# Patient Record
Sex: MALE | Race: WHITE | Employment: OTHER | URBAN - NONMETROPOLITAN AREA
[De-identification: names, ages, dates, MRNs, and addresses within clinical notes are randomized per-mention and may not be internally consistent; named-entity substitution may affect disease eponyms.]

---

## 2018-11-16 ENCOUNTER — TELEPHONE (OUTPATIENT)
Dept: BARIATRICS/WEIGHT MGMT | Age: 53
End: 2018-11-16

## 2018-12-14 ENCOUNTER — OFFICE VISIT (OUTPATIENT)
Dept: BARIATRICS/WEIGHT MGMT | Age: 53
End: 2018-12-14
Payer: COMMERCIAL

## 2018-12-14 VITALS
HEART RATE: 88 BPM | WEIGHT: 235 LBS | HEIGHT: 65 IN | DIASTOLIC BLOOD PRESSURE: 110 MMHG | BODY MASS INDEX: 39.15 KG/M2 | TEMPERATURE: 97.9 F | SYSTOLIC BLOOD PRESSURE: 164 MMHG

## 2018-12-14 DIAGNOSIS — E66.9 OBESITY (BMI 30-39.9): Primary | ICD-10-CM

## 2018-12-14 DIAGNOSIS — K21.9 GASTROESOPHAGEAL REFLUX DISEASE, ESOPHAGITIS PRESENCE NOT SPECIFIED: ICD-10-CM

## 2018-12-14 DIAGNOSIS — G89.29 CHRONIC BILATERAL LOW BACK PAIN WITHOUT SCIATICA: ICD-10-CM

## 2018-12-14 DIAGNOSIS — E78.00 HYPERCHOLESTEREMIA: ICD-10-CM

## 2018-12-14 DIAGNOSIS — N20.0 NEPHROLITHIASIS: ICD-10-CM

## 2018-12-14 DIAGNOSIS — G47.33 OBSTRUCTIVE SLEEP APNEA: ICD-10-CM

## 2018-12-14 DIAGNOSIS — M54.50 CHRONIC BILATERAL LOW BACK PAIN WITHOUT SCIATICA: ICD-10-CM

## 2018-12-14 DIAGNOSIS — F32.A DEPRESSION, UNSPECIFIED DEPRESSION TYPE: ICD-10-CM

## 2018-12-14 DIAGNOSIS — I10 HYPERTENSION, UNSPECIFIED TYPE: ICD-10-CM

## 2018-12-14 PROCEDURE — 99203 OFFICE O/P NEW LOW 30 MIN: CPT | Performed by: SURGERY

## 2018-12-14 ASSESSMENT — ENCOUNTER SYMPTOMS
COLOR CHANGE: 0
VOMITING: 0
CHEST TIGHTNESS: 0
SHORTNESS OF BREATH: 0
NAUSEA: 0
EYE REDNESS: 0
ANAL BLEEDING: 0
RHINORRHEA: 0
STRIDOR: 0
EYE ITCHING: 0
TROUBLE SWALLOWING: 0
DIARRHEA: 0
BACK PAIN: 1
CONSTIPATION: 0
EYE PAIN: 0
APNEA: 0
ABDOMINAL DISTENTION: 0
FACIAL SWELLING: 0
WHEEZING: 0
VOICE CHANGE: 0
SINUS PRESSURE: 0
ALLERGIC/IMMUNOLOGIC NEGATIVE: 1
PHOTOPHOBIA: 0
BLOOD IN STOOL: 0
COUGH: 0
ABDOMINAL PAIN: 0
RECTAL PAIN: 0
EYE DISCHARGE: 0
SORE THROAT: 0
CHOKING: 0

## 2018-12-14 NOTE — LETTER
12/14/2018    Dear Marty De La O DO,  I was pleased to see your patient Allyssa Duenas (date of birth 1965) in the [address] [place] [address] [facility] for an evaluation. Below are the relevant portions of my assessment and plan of care. Assessment:  1. Obesity (BMI 39)   2. Sleep apnea  3. Hypercholesterolemia  4. Chronic lower back pain  5. Nephrolithiasis  6. Hypertension  7. Depression  8. GERD    Plan:  Management of Allyssa alba obesity through a monitored weight loss program with long-term follow-up. Janes Ojeda is interested in Bariatric surgery for treatment of his morbid obesity. We discussed the various surgeries with their associated risks and benefits, along with alternatives to surgery. He has elected to proceed with a Robotic sleeve gastrectomy, if he is not able to lose adequate excess body weight by medical management only. It is my opinion that significant weight loss through a monitored program including bariatric surgery will likely improve and often resolve many of the patients weight related co-morbid conditions. Failure to undergo sustained weight loss will likely result in progression of current conditions and may lead to an early death. We offer a comprehensive, multidisciplinary program designed to meet the needs required for long-term weight loss treatment of Janes Mock obesity. A program such as this is needed to have maximum long-term success with weight loss for patients who choose to undergo surgery. 1. Janes Ojeda will start with a referral to our dietitian's for evaluation and treatment. At the visit, goals and nutrition plans are instituted, including vitamin supplementation. He will meet with the dietitian each month to assess progress and any barriers that Janes Ojeda is experiencing to accomplish his weight loss goals. 3% total body weight loss is required prior to any surgical intervention.

## 2018-12-14 NOTE — PROGRESS NOTES
Current Outpatient Prescriptions   Medication Sig Dispense Refill    metoprolol succinate (TOPROL XL) 50 MG extended release tablet Take 1 tablet by mouth daily 90 tablet 3    naproxen (NAPROSYN) 500 MG tablet Take 1 tablet by mouth 2 times daily as needed for Pain 28 tablet 0    fluticasone (FLONASE) 50 MCG/ACT nasal spray 2 sprays by Nasal route daily 1 Bottle 3     No current facility-administered medications for this visit. No Known Allergies    Family History   Problem Relation Age of Onset    Stroke Father     Kidney Disease Father     Heart Disease Mother     Cancer Mother         kidney diagnosed at age 79       Social History     Social History    Marital status:      Spouse name: N/A    Number of children: N/A    Years of education: N/A     Occupational History    Not on file. Social History Main Topics    Smoking status: Never Smoker    Smokeless tobacco: Never Used    Alcohol use No    Drug use: No    Sexual activity: Yes     Other Topics Concern    Not on file     Social History Narrative    No narrative on file     Vitals:    12/14/18 1059   BP: (!) 164/110   Pulse: 88   Temp: 97.9 °F (36.6 °C)     Body mass index is 39.11 kg/m². Objective:   Physical Exam   Constitutional: He is oriented to person, place, and time. He appears well-developed and well-nourished. He is active and cooperative. Non-toxic appearance. He does not appear ill. No distress. HENT:   Head: Normocephalic and atraumatic. Not macrocephalic and not microcephalic. Head is without raccoon's eyes, without Cruz's sign, without abrasion, without contusion, without laceration, without right periorbital erythema and without left periorbital erythema. Right Ear: External ear normal.   Left Ear: External ear normal.   Nose: Nose normal.   Mouth/Throat: Oropharynx is clear and moist and mucous membranes are normal. No oropharyngeal exudate.    Eyes: Pupils are equal, round, and reactive to Results   Component Value Date     03/21/2016    K 4.1 03/21/2016     03/21/2016    CO2 24 03/21/2016     Lab Results   Component Value Date    CREATININE 1.1 03/21/2016     Lab Results   Component Value Date    WBC 8.1 03/21/2016    HGB 15.4 03/21/2016    HCT 46.0 03/21/2016    MCV 89.5 03/21/2016     03/21/2016     Lab Results   Component Value Date    ALT 30 03/21/2016    AST 45 (H) 03/21/2016    ALKPHOS 51 03/21/2016    BILITOT 0.4 03/21/2016     Imaging - none    Patient Active Problem List   Diagnosis    Essential hypertension    Hyperlipidemia    Chest pain at rest    Syncope and collapse    Chest heaviness    Obstructive sleep apnea syndrome    Non morbid obesity due to excess calories     Assessment:      1. Obesity (BMI 39)   2. Sleep apnea  3. Hypercholesterolemia  4. Chronic lower back pain  5. Nephrolithiasis  6. Hypertension  7. Depression  8. GERD      Plan:      1. Long discussion about the pros and cons of weight loss surgery. The risks benefits and alternatives to laparoscopic adjustable band, gastric sleeve and gastric Jaime-en-Y bypass were discussed in detail. The pros and cons of robotic assisted, laparoscopic and open techniques were discussed. 2.  Behavior modification discussed in detail in regards to dietary habits. 3.  Nutritional education occurred during visit. Will set up a consultation/evaluation with dietitian for further evaluation. 4.  Options for medical management of morbid obesity discussed. 5.  Improvement in fitness/exercise discussed with patient and the need for this with/without surgery. 6.  Obtain medical necessity letter from PCP as needed. 7.  Follow-up in one month at weight management program at [facility]. 8.  Signs and symptoms reviewed with patient that would be concerning and need him to return to office for re-evaluation. Patient states he will call if he has questions or concerns. 9. Multivitamin  10.

## 2019-01-04 ENCOUNTER — OFFICE VISIT (OUTPATIENT)
Dept: BARIATRICS/WEIGHT MGMT | Age: 54
End: 2019-01-04

## 2019-01-04 DIAGNOSIS — E66.01 MORBID OBESITY DUE TO EXCESS CALORIES (HCC): Primary | ICD-10-CM

## 2019-01-18 ENCOUNTER — HOSPITAL ENCOUNTER (OUTPATIENT)
Age: 54
Setting detail: SPECIMEN
Discharge: HOME | End: 2019-01-18
Payer: MEDICARE

## 2019-01-19 LAB
-: NORMAL
ALBUMIN SERPL-MCNC: 4.6 G/DL (ref 3.5–5.2)
ALBUMIN/GLOBULIN RATIO: 1.5 (ref 1–2.5)
ALP BLD-CCNC: 79 U/L (ref 40–129)
ALT SERPL-CCNC: 27 U/L (ref 5–41)
ANION GAP SERPL CALCULATED.3IONS-SCNC: 12 MMOL/L (ref 9–17)
AST SERPL-CCNC: 18 U/L
BILIRUB SERPL-MCNC: 0.32 MG/DL (ref 0.3–1.2)
BUN BLDV-MCNC: 19 MG/DL (ref 6–20)
BUN/CREAT BLD: NORMAL (ref 9–20)
CALCIUM SERPL-MCNC: 9.4 MG/DL (ref 8.6–10.4)
CHLORIDE BLD-SCNC: 100 MMOL/L (ref 98–107)
CHOLESTEROL/HDL RATIO: 5.3
CHOLESTEROL: 168 MG/DL
CO2: 23 MMOL/L (ref 20–31)
CREAT SERPL-MCNC: 1.2 MG/DL (ref 0.7–1.2)
FERRITIN: 316 UG/L (ref 30–400)
FOLATE: 10.4 NG/ML
GFR AFRICAN AMERICAN: >60 ML/MIN
GFR NON-AFRICAN AMERICAN: >60 ML/MIN
GFR SERPL CREATININE-BSD FRML MDRD: NORMAL ML/MIN/{1.73_M2}
GFR SERPL CREATININE-BSD FRML MDRD: NORMAL ML/MIN/{1.73_M2}
GLUCOSE BLD-MCNC: 83 MG/DL (ref 70–99)
HCT VFR BLD CALC: 50.2 % (ref 40.7–50.3)
HDLC SERPL-MCNC: 32 MG/DL
HEMOGLOBIN: 17 G/DL (ref 13–17)
IRON SATURATION: 26 % (ref 20–55)
IRON: 91 UG/DL (ref 59–158)
LDL CHOLESTEROL: 110 MG/DL (ref 0–130)
MCH RBC QN AUTO: 31 PG (ref 25.2–33.5)
MCHC RBC AUTO-ENTMCNC: 33.9 G/DL (ref 28.4–34.8)
MCV RBC AUTO: 91.6 FL (ref 82.6–102.9)
NRBC AUTOMATED: 0 PER 100 WBC
PDW BLD-RTO: 13 % (ref 11.8–14.4)
PLATELET # BLD: 323 K/UL (ref 138–453)
PMV BLD AUTO: 10.2 FL (ref 8.1–13.5)
POTASSIUM SERPL-SCNC: 4.3 MMOL/L (ref 3.7–5.3)
PROSTATE SPECIFIC ANTIGEN: 0.87 UG/L
RBC # BLD: 5.48 M/UL (ref 4.21–5.77)
REASON FOR REJECTION: NORMAL
SODIUM BLD-SCNC: 135 MMOL/L (ref 135–144)
TOTAL IRON BINDING CAPACITY: 353 UG/DL (ref 250–450)
TOTAL PROTEIN: 7.6 G/DL (ref 6.4–8.3)
TRIGL SERPL-MCNC: 132 MG/DL
TSH SERPL DL<=0.05 MIU/L-ACNC: 2.02 MIU/L (ref 0.3–5)
UNSATURATED IRON BINDING CAPACITY: 262 UG/DL (ref 112–347)
VITAMIN B-12: 724 PG/ML (ref 232–1245)
VITAMIN D 25-HYDROXY: 26.3 NG/ML (ref 30–100)
VLDLC SERPL CALC-MCNC: ABNORMAL MG/DL (ref 1–30)
WBC # BLD: 7.8 K/UL (ref 3.5–11.3)
ZZ NTE CLEAN UP: ORDERED TEST: NORMAL
ZZ NTE WITH NAME CLEAN UP: SPECIMEN SOURCE: NORMAL

## 2019-01-20 LAB
ESTIMATED AVERAGE GLUCOSE: 111 MG/DL
HBA1C MFR BLD: 5.5 % (ref 4–6)

## 2019-01-21 ENCOUNTER — HOSPITAL ENCOUNTER (OUTPATIENT)
Age: 54
Setting detail: SPECIMEN
Discharge: HOME | End: 2019-01-21
Payer: MEDICARE

## 2019-01-23 ENCOUNTER — HOSPITAL ENCOUNTER (OUTPATIENT)
Age: 54
Setting detail: SPECIMEN
Discharge: HOME | End: 2019-01-23
Payer: MEDICARE

## 2019-01-24 LAB
3-OH-COTININE URINE: <50 NG/ML
ANABASINE URINE: <3 NG/ML
COTININE, URINE: <5 NG/ML
NICOTINE URINE: 2 NG/ML
NORNICOTINE URINE: <2 NG/ML
PTH INTACT: 66.78 PG/ML (ref 15–65)

## 2019-01-25 LAB
-: NORMAL
REASON FOR REJECTION: NORMAL
ZZ NTE CLEAN UP: ORDERED TEST: NORMAL
ZZ NTE WITH NAME CLEAN UP: SPECIMEN SOURCE: NORMAL

## 2019-02-04 ENCOUNTER — OFFICE VISIT (OUTPATIENT)
Dept: BARIATRICS/WEIGHT MGMT | Age: 54
End: 2019-02-04

## 2019-02-04 ENCOUNTER — OFFICE VISIT (OUTPATIENT)
Dept: BARIATRICS/WEIGHT MGMT | Age: 54
End: 2019-02-04
Payer: COMMERCIAL

## 2019-02-04 VITALS
DIASTOLIC BLOOD PRESSURE: 100 MMHG | WEIGHT: 226 LBS | SYSTOLIC BLOOD PRESSURE: 142 MMHG | TEMPERATURE: 98.5 F | RESPIRATION RATE: 18 BRPM | HEIGHT: 65 IN | BODY MASS INDEX: 37.65 KG/M2 | HEART RATE: 81 BPM

## 2019-02-04 DIAGNOSIS — E66.09 CLASS 2 OBESITY DUE TO EXCESS CALORIES WITH BODY MASS INDEX (BMI) OF 39.0 TO 39.9 IN ADULT, UNSPECIFIED WHETHER SERIOUS COMORBIDITY PRESENT: Primary | ICD-10-CM

## 2019-02-04 DIAGNOSIS — E66.01 MORBID OBESITY DUE TO EXCESS CALORIES (HCC): Primary | ICD-10-CM

## 2019-02-04 DIAGNOSIS — F32.A DEPRESSION, UNSPECIFIED DEPRESSION TYPE: ICD-10-CM

## 2019-02-04 DIAGNOSIS — I10 ESSENTIAL HYPERTENSION: ICD-10-CM

## 2019-02-04 DIAGNOSIS — E55.9 VITAMIN D DEFICIENCY: ICD-10-CM

## 2019-02-04 DIAGNOSIS — K21.9 GASTROESOPHAGEAL REFLUX DISEASE, ESOPHAGITIS PRESENCE NOT SPECIFIED: ICD-10-CM

## 2019-02-04 DIAGNOSIS — E78.00 HYPERCHOLESTEREMIA: ICD-10-CM

## 2019-02-04 DIAGNOSIS — G47.33 OSA (OBSTRUCTIVE SLEEP APNEA): ICD-10-CM

## 2019-02-04 PROCEDURE — 99214 OFFICE O/P EST MOD 30 MIN: CPT | Performed by: PHYSICIAN ASSISTANT

## 2019-02-18 ENCOUNTER — HOSPITAL ENCOUNTER (OUTPATIENT)
Age: 54
Setting detail: SPECIMEN
Discharge: HOME | End: 2019-02-18
Payer: MEDICARE

## 2019-02-22 LAB — VITAMIN B1 WHOLE BLOOD: 117 NMOL/L (ref 70–180)

## 2019-05-22 ENCOUNTER — HOSPITAL ENCOUNTER (EMERGENCY)
Dept: HOSPITAL 83 - ED | Age: 54
LOS: 1 days | Discharge: HOME | End: 2019-05-23
Payer: SELF-PAY

## 2019-05-22 VITALS — HEIGHT: 60 IN
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
## 2019-05-22 DIAGNOSIS — S46.911A: Primary | ICD-10-CM
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
## 2020-03-18 ENCOUNTER — HOSPITAL ENCOUNTER (OUTPATIENT)
Age: 55
Setting detail: SPECIMEN
Discharge: HOME | End: 2020-03-18
Payer: MEDICARE

## 2020-03-18 LAB
ABSOLUTE EOS #: 0.66 K/UL (ref 0–0.44)
ABSOLUTE IMMATURE GRANULOCYTE: <0.03 K/UL (ref 0–0.3)
ABSOLUTE LYMPH #: 2.09 K/UL (ref 1.1–3.7)
ABSOLUTE MONO #: 0.79 K/UL (ref 0.1–1.2)
ALBUMIN SERPL-MCNC: 4.3 G/DL (ref 3.5–5.2)
ALBUMIN/GLOBULIN RATIO: 1.3 (ref 1–2.5)
ALP BLD-CCNC: 69 U/L (ref 40–129)
ALT SERPL-CCNC: 20 U/L (ref 5–41)
ANION GAP SERPL CALCULATED.3IONS-SCNC: 14 MMOL/L (ref 9–17)
AST SERPL-CCNC: 14 U/L
BASOPHILS # BLD: 1 % (ref 0–2)
BASOPHILS ABSOLUTE: 0.06 K/UL (ref 0–0.2)
BILIRUB SERPL-MCNC: 0.23 MG/DL (ref 0.3–1.2)
BUN BLDV-MCNC: 17 MG/DL (ref 6–20)
BUN/CREAT BLD: ABNORMAL (ref 9–20)
CALCIUM SERPL-MCNC: 9.6 MG/DL (ref 8.6–10.4)
CHLORIDE BLD-SCNC: 101 MMOL/L (ref 98–107)
CHOLESTEROL/HDL RATIO: 5.9
CHOLESTEROL: 205 MG/DL
CO2: 24 MMOL/L (ref 20–31)
CREAT SERPL-MCNC: 1.14 MG/DL (ref 0.7–1.2)
DIFFERENTIAL TYPE: ABNORMAL
EOSINOPHILS RELATIVE PERCENT: 8 % (ref 1–4)
GFR AFRICAN AMERICAN: >60 ML/MIN
GFR NON-AFRICAN AMERICAN: >60 ML/MIN
GFR SERPL CREATININE-BSD FRML MDRD: ABNORMAL ML/MIN/{1.73_M2}
GFR SERPL CREATININE-BSD FRML MDRD: ABNORMAL ML/MIN/{1.73_M2}
GLUCOSE BLD-MCNC: 114 MG/DL (ref 70–99)
HAV IGM SER IA-ACNC: NONREACTIVE
HCT VFR BLD CALC: 48.9 % (ref 40.7–50.3)
HDLC SERPL-MCNC: 35 MG/DL
HEMOGLOBIN: 16.4 G/DL (ref 13–17)
HEPATITIS B CORE IGM ANTIBODY: NONREACTIVE
HEPATITIS B SURFACE ANTIGEN: NONREACTIVE
HEPATITIS C ANTIBODY: NONREACTIVE
IMMATURE GRANULOCYTES: 0 %
LDL CHOLESTEROL: 133 MG/DL (ref 0–130)
LYMPHOCYTES # BLD: 25 % (ref 24–43)
MCH RBC QN AUTO: 30.1 PG (ref 25.2–33.5)
MCHC RBC AUTO-ENTMCNC: 33.5 G/DL (ref 28.4–34.8)
MCV RBC AUTO: 89.9 FL (ref 82.6–102.9)
MONOCYTES # BLD: 10 % (ref 3–12)
NRBC AUTOMATED: 0 PER 100 WBC
PDW BLD-RTO: 13.1 % (ref 11.8–14.4)
PLATELET # BLD: 280 K/UL (ref 138–453)
PLATELET ESTIMATE: ABNORMAL
PMV BLD AUTO: 9.9 FL (ref 8.1–13.5)
POTASSIUM SERPL-SCNC: 3.9 MMOL/L (ref 3.7–5.3)
RBC # BLD: 5.44 M/UL (ref 4.21–5.77)
RBC # BLD: ABNORMAL 10*6/UL
SEG NEUTROPHILS: 56 % (ref 36–65)
SEGMENTED NEUTROPHILS ABSOLUTE COUNT: 4.72 K/UL (ref 1.5–8.1)
SODIUM BLD-SCNC: 139 MMOL/L (ref 135–144)
TOTAL PROTEIN: 7.5 G/DL (ref 6.4–8.3)
TRIGL SERPL-MCNC: 183 MG/DL
TSH SERPL DL<=0.05 MIU/L-ACNC: 1.28 MIU/L (ref 0.3–5)
VLDLC SERPL CALC-MCNC: ABNORMAL MG/DL (ref 1–30)
WBC # BLD: 8.3 K/UL (ref 3.5–11.3)
WBC # BLD: ABNORMAL 10*3/UL

## 2020-03-19 LAB
PROSTATE SPECIFIC ANTIGEN FREE: 0.2 UG/L
PROSTATE SPECIFIC ANTIGEN PERCENT FREE: 25 %
PROSTATE SPECIFIC ANTIGEN: 0.8 UG/L (ref 0–4)

## 2022-01-11 ENCOUNTER — HOSPITAL ENCOUNTER (EMERGENCY)
Age: 57
Discharge: HOME | End: 2022-01-11

## 2022-01-11 VITALS
HEART RATE: 97 BPM | TEMPERATURE: 97 F | RESPIRATION RATE: 18 BRPM | DIASTOLIC BLOOD PRESSURE: 130 MMHG | SYSTOLIC BLOOD PRESSURE: 198 MMHG | OXYGEN SATURATION: 97 %

## 2022-01-11 DIAGNOSIS — I10 UNCONTROLLED HYPERTENSION: ICD-10-CM

## 2022-01-11 DIAGNOSIS — R21 RASH AND OTHER NONSPECIFIC SKIN ERUPTION: Primary | ICD-10-CM

## 2022-01-11 PROCEDURE — 99202 OFFICE O/P NEW SF 15 MIN: CPT | Performed by: NURSE PRACTITIONER

## 2022-01-11 PROCEDURE — 99213 OFFICE O/P EST LOW 20 MIN: CPT

## 2022-01-11 RX ORDER — PREDNISONE 10 MG/1
TABLET ORAL
Qty: 40 TABLET | Refills: 0 | Status: SHIPPED | OUTPATIENT
Start: 2022-01-11 | End: 2022-01-21

## 2022-01-11 ASSESSMENT — ENCOUNTER SYMPTOMS: COLOR CHANGE: 0

## 2022-01-11 NOTE — ED PROVIDER NOTES
SURGICALHISTORY     Patient  has a past surgical history that includes Cholecystectomy (2013); Ankle surgery (Right); Arm Surgery (Right); and Hand surgery (Right). CURRENT MEDICATIONS       Previous Medications    FLUTICASONE (FLONASE) 50 MCG/ACT NASAL SPRAY    2 sprays by Nasal route daily    METOPROLOL SUCCINATE (TOPROL XL) 50 MG EXTENDED RELEASE TABLET    Take 1 tablet by mouth daily    NAPROXEN (NAPROSYN) 500 MG TABLET    Take 1 tablet by mouth 2 times daily as needed for Pain       ALLERGIES     Patient is is allergic to hydrocodone. Patients   Immunization History   Administered Date(s) Administered    Influenza Virus Vaccine 12/08/2015    Influenza, [employer], IM, PF (6 mo and older Fluzone, Flulaval, Fluarix, and 3 yrs and older Afluria) 10/05/2016       FAMILY HISTORY     Patient's family history includes Cancer in his mother; Heart Disease in his mother; Kidney Disease in his father; Stroke in his father. SOCIAL HISTORY     Patient  reports that he has never smoked. He has never used smokeless tobacco. He reports that he does not drink alcohol and does not use drugs. PHYSICAL EXAM     ED TRIAGE VITALS  BP: (!) 198/130, Temp: 97 °F (36.1 °C), Pulse: 97, Resp: 18, SpO2: 97 %,Estimated body mass index is 37.61 kg/m² as calculated from the following:    Height as of 2/4/19: 5' 5\" (1.651 m). Weight as of 2/4/19: 226 lb (102.5 kg). ,No LMP for male patient. Physical Exam  Vitals and nursing note reviewed. Constitutional:       General: He is not in acute distress. Appearance: He is well-developed. He is not ill-appearing, toxic-appearing or diaphoretic. HENT:      Head: Normocephalic. Nose: Nose normal.   Cardiovascular:      Rate and Rhythm: Normal rate. Pulmonary:      Effort: Pulmonary effort is normal.   Musculoskeletal:      Cervical back: Normal range of motion. Skin:     General: Skin is warm and dry. Coloration: Skin is not pale.       Findings: Erythema and rash present. No abrasion, ecchymosis, laceration or petechiae. Rash is papular. Rash is not crusting, macular, purpuric, pustular, urticarial or vesicular. Comments: Bilateral arms and back    Patient has a scattered papular rash noted to the arms primarily on the forearms down to the hands as well as the right and left side of the neck and to the lower back. Patient complains of some itching. Neurological:      Mental Status: He is alert and oriented to person, place, and time. Psychiatric:         Mood and Affect: Mood normal.         Behavior: Behavior normal. Behavior is cooperative. DIAGNOSTIC RESULTS     Labs:No results found for this visit on 01/11/22. IMAGING:    No orders to display         EKG:      URGENT CARE COURSE:     Vitals:    01/11/22 1644   BP: (!) 198/130   Pulse: 97   Resp: 18   Temp: 97 °F (36.1 °C)   TempSrc: Tympanic   SpO2: 97%       Medications - No data to display         PROCEDURES:  None    FINAL IMPRESSION      1. Rash and other nonspecific skin eruption    2. Uncontrolled hypertension          DISPOSITION/ PLAN     Take Medication as Directed  Benadryl for itch, Don't scratch  Monitor for any increase in size or spreading  Monitor for fever or chills  Keep drainage covered if any.   Follow up with your PCP or return as needed  Or go to the emergency Department    PATIENT REFERRED TO:  NICOLE Chicas CNP  [address] [place] [id number]      DISCHARGE MEDICATIONS:  New Prescriptions    PREDNISONE (DELTASONE) 10 MG TABLET    40 mg's po x 4 days, then 30 mg's po x 4 days, 20 mg's x 4 days, the 10 mgs po x 4 days       Discontinued Medications    No medications on file       Current Discharge Medication List          NICOLE Cao CNP    (Please note that portions of this note were completed with a voice recognition program. Efforts were made to edit the dictations but occasionally words are mis-transcribed.)           NICOLE Cao

## 2022-01-11 NOTE — ED TRIAGE NOTES
Patient presents to [facility] with complaints of rash on arms, and middle of the back that showed up x2 days ago. Patient states he recently traveled to [place] twice this past month and sleeps in his car. Patient states he is due to take his blood pressure medication this evening.

## 2022-01-26 ENCOUNTER — HOSPITAL ENCOUNTER (EMERGENCY)
Age: 57
Discharge: HOME | End: 2022-01-26

## 2022-01-26 VITALS
HEIGHT: 66 IN | SYSTOLIC BLOOD PRESSURE: 113 MMHG | WEIGHT: 230 LBS | OXYGEN SATURATION: 94 % | RESPIRATION RATE: 14 BRPM | DIASTOLIC BLOOD PRESSURE: 90 MMHG | HEART RATE: 88 BPM | TEMPERATURE: 98.4 F | BODY MASS INDEX: 36.96 KG/M2

## 2022-01-26 DIAGNOSIS — U07.1 COVID-19: Primary | ICD-10-CM

## 2022-01-26 LAB — SARS-COV-2, NAA: DETECTED

## 2022-01-26 PROCEDURE — 99213 OFFICE O/P EST LOW 20 MIN: CPT | Performed by: NURSE PRACTITIONER

## 2022-01-26 PROCEDURE — 87635 SARS-COV-2 COVID-19 AMP PRB: CPT

## 2022-01-26 PROCEDURE — 99213 OFFICE O/P EST LOW 20 MIN: CPT

## 2022-01-26 RX ORDER — DEXAMETHASONE 6 MG/1
6 TABLET ORAL 2 TIMES DAILY WITH MEALS
Qty: 10 TABLET | Refills: 0 | Status: SHIPPED | OUTPATIENT
Start: 2022-01-26 | End: 2022-01-31

## 2022-01-26 RX ORDER — ALBUTEROL SULFATE 90 UG/1
2 AEROSOL, METERED RESPIRATORY (INHALATION) 4 TIMES DAILY PRN
Qty: 54 G | Refills: 1 | Status: SHIPPED | OUTPATIENT
Start: 2022-01-26

## 2022-01-26 RX ORDER — BENZONATATE 200 MG/1
200 CAPSULE ORAL 3 TIMES DAILY PRN
Qty: 21 CAPSULE | Refills: 0 | Status: SHIPPED | OUTPATIENT
Start: 2022-01-26 | End: 2022-02-02

## 2022-01-26 ASSESSMENT — ENCOUNTER SYMPTOMS
CHEST TIGHTNESS: 1
COUGH: 1
SORE THROAT: 0
SHORTNESS OF BREATH: 1
WHEEZING: 0
VOMITING: 0
NAUSEA: 0

## 2022-01-26 ASSESSMENT — PAIN DESCRIPTION - LOCATION: LOCATION: ABDOMEN

## 2022-01-26 ASSESSMENT — PAIN SCALES - GENERAL: PAINLEVEL_OUTOF10: 7

## 2022-01-26 NOTE — ED PROVIDER NOTES
[facility]  Urgent Care Encounter       CHIEF COMPLAINT       Chief Complaint   Patient presents with    Cough     CHEST CONGESTION CHILLS       Nurses Notes reviewed and I agree except as noted in the HPI. HISTORY OF PRESENT ILLNESS   Susana Alvarez is a 49 y.o. male who presents with complaints of cough, chest congestion and tightness, chills, headache, and body aches. His symptoms have been present for the past 3 to 4 days. He admits to his wife being sick as well. He mainly complains of an irritating cough. He has not taken anything for treatment. He is unsure of anything that makes it better or worse. He denies any fever, chills, or chest pain. He states he is \"always short of breath\". The history is provided by the patient. REVIEW OF SYSTEMS     Review of Systems   Constitutional: Negative for chills and fever. HENT: Positive for congestion. Negative for sore throat. Respiratory: Positive for cough, chest tightness and shortness of breath. Negative for wheezing. Cardiovascular: Negative for chest pain. Gastrointestinal: Negative for nausea and vomiting. Musculoskeletal: Positive for myalgias. Neurological: Positive for headaches. PAST MEDICAL HISTORY         Diagnosis Date    ADHD (attention deficit hyperactivity disorder)     Depression     GERD (gastroesophageal reflux disease)     History of blood transfusion     Hyperlipidemia     Hypertension     S/P left heart catheterization by percutaneous approach 2000    Sleep apnea     reports noncompliance with machine        SURGICALHISTORY     Patient  has a past surgical history that includes Cholecystectomy (2013); Ankle surgery (Right); Arm Surgery (Right); and Hand surgery (Right).     CURRENT MEDICATIONS       Previous Medications    FLUTICASONE (FLONASE) 50 MCG/ACT NASAL SPRAY    2 sprays by Nasal route daily    METOPROLOL SUCCINATE (TOPROL XL) 50 MG EXTENDED RELEASE TABLET    Take 1 tablet by mouth daily    NAPROXEN (NAPROSYN) 500 MG TABLET    Take 1 tablet by mouth 2 times daily as needed for Pain       ALLERGIES     Patient is is allergic to hydrocodone. Patients   Immunization History   Administered Date(s) Administered    Influenza Virus Vaccine 12/08/2015    Influenza, [employer], IM, PF (6 mo and older Fluzone, Flulaval, Fluarix, and 3 yrs and older Afluria) 10/05/2016       FAMILY HISTORY     Patient's family history includes Cancer in his mother; Heart Disease in his mother; Kidney Disease in his father; Stroke in his father. SOCIAL HISTORY     Patient  reports that he has never smoked. He has never used smokeless tobacco. He reports that he does not drink alcohol and does not use drugs. PHYSICAL EXAM     ED TRIAGE VITALS  BP: (!) 113/90, Temp: 98.4 °F (36.9 °C), Pulse: 88, Resp: 14, SpO2: 94 %,Estimated body mass index is 37.12 kg/m² as calculated from the following:    Height as of this encounter: 5' 6\" (1.676 m). Weight as of this encounter: 230 lb (104.3 kg). ,No LMP for male patient. Physical Exam  Vitals and nursing note reviewed. Constitutional:       Appearance: He is ill-appearing. HENT:      Right Ear: Tympanic membrane, ear canal and external ear normal.      Left Ear: Tympanic membrane, ear canal and external ear normal.      Nose: Congestion present. Mouth/Throat:      Mouth: Mucous membranes are moist.      Pharynx: No posterior oropharyngeal erythema. Cardiovascular:      Rate and Rhythm: Normal rate and regular rhythm. Heart sounds: Normal heart sounds. Pulmonary:      Effort: Pulmonary effort is normal.      Breath sounds: Rales (bilateral lower lobes) present. No wheezing or rhonchi. Skin:     General: Skin is warm and dry. Neurological:      Mental Status: He is alert and oriented to person, place, and time.        DIAGNOSTIC RESULTS     Labs:  Results for orders placed or performed during the hospital encounter of 01/26/22   COVID-19, Rapid Result Value Ref Range    SARS-CoV-2, JOEL DETECTED (AA) NOT DETECTED       IMAGING:  None    EKG:  None    URGENT CARE COURSE:     Vitals:    01/26/22 1649   BP: (!) 113/90   Pulse: 88   Resp: 14   Temp: 98.4 °F (36.9 °C)   SpO2: 94%   Weight: 230 lb (104.3 kg)   Height: 5' 6\" (1.676 m)       Medications - No data to display       PROCEDURES:  None    FINAL IMPRESSION      1. COVID-19      DISPOSITION/ PLAN   DISPOSITION Decision To Discharge 01/26/2022 05:05:31 PM     Discussed with the patient that exam is consistent with a viral illness and that I believe testing for coronavirus is warranted at this time. Test was completed during visit today and patient is advised to quarantine due to a positive result for a total of 5 days from symptom start. Patient is advised to follow-up as directed by the health department. Advised to rest and hydrate and use over-the-counter antipyretic medications as needed for fever or body aches. We will provide the patient with Decadron, Tessalon Perles, and albuterol inhaler. Patient is advised to present to the ER for any worsening symptoms and is agreeable to plan as discussed.     PATIENT REFERRED TO:  NICOLE Vásquez CNP  [address] [facility] [id number]      DISCHARGE MEDICATIONS:  New Prescriptions    ALBUTEROL SULFATE HFA (VENTOLIN HFA) 108 (90 BASE) MCG/ACT INHALER    Inhale 2 puffs into the lungs 4 times daily as needed for Wheezing    BENZONATATE (TESSALON) 200 MG CAPSULE    Take 1 capsule by mouth 3 times daily as needed for Cough    DEXAMETHASONE (DECADRON) 6 MG TABLET    Take 1 tablet by mouth 2 times daily (with meals) for 5 days       Discontinued Medications    No medications on file       Current Discharge Medication List          NICOLE Bar CNP    (Please note that portions of this note were completed with a voice recognition program. Efforts were made to edit the dictations but occasionally words are mis-transcribed.)            Shaina Ornelas NICOLE - CNP  01/26/22 2092

## 2022-01-26 NOTE — Clinical Note
Jewels Stein was seen and treated in our emergency department on 1/26/2022. He may return to work on 01/31/2022. If you have any questions or concerns, please don't hesitate to call.       Shawna Norton, NICOLE - CNP

## 2022-01-27 ENCOUNTER — CARE COORDINATION (OUTPATIENT)
Dept: CARE COORDINATION | Age: 57
End: 2022-01-27

## 2022-01-28 NOTE — CARE COORDINATION
Attempted to reach patient for ED follow up in regards to COVID-19 education/ monitoring. Patient was unavailable at the time of my call, and a generic voicemail message was left asking patient to return my call at [phone].

## 2022-02-17 ENCOUNTER — HOSPITAL ENCOUNTER (OUTPATIENT)
Age: 57
Setting detail: SPECIMEN
Discharge: HOME | End: 2022-02-17

## 2022-02-17 LAB
CHOLESTEROL/HDL RATIO: 6
CHOLESTEROL: 247 MG/DL
HDLC SERPL-MCNC: 41 MG/DL
LDL CHOLESTEROL: 175 MG/DL (ref 0–130)
TRIGL SERPL-MCNC: 157 MG/DL
VLDLC SERPL CALC-MCNC: ABNORMAL MG/DL (ref 1–30)

## 2023-07-24 ENCOUNTER — TELEPHONE (OUTPATIENT)
Dept: PRIMARY CARE | Facility: CLINIC | Age: 58
End: 2023-07-24
Payer: MEDICAID

## 2023-07-24 NOTE — TELEPHONE ENCOUNTER
Patient's Que Mojica called in regarding the patient. She is wondering if Dr. Mendez will take him on as a new patient as he is her PCP  Please Advise

## 2023-07-28 NOTE — TELEPHONE ENCOUNTER
Call patient and scheduled to establish care. Patient is scheduled for September 7, 2023 at 945 am.

## 2023-09-14 NOTE — TELEPHONE ENCOUNTER
PATIENTS BRAYAN AMBROSE CALLED TO SCHEDULE ANOTHER NPV FOR PATIENT. PATIENT NO SHOWED HIS APPT ON 9/7.  PLEASE ADVISE

## 2023-10-20 ENCOUNTER — TELEPHONE (OUTPATIENT)
Dept: CARDIOLOGY | Facility: CLINIC | Age: 58
End: 2023-10-20
Payer: MEDICAID

## 2023-10-20 PROBLEM — F41.9 ANXIETY: Status: ACTIVE | Noted: 2023-10-20

## 2023-10-20 PROBLEM — E78.2 MIXED HYPERLIPIDEMIA: Status: ACTIVE | Noted: 2023-10-20

## 2023-10-20 PROBLEM — Z98.61 CAD S/P PERCUTANEOUS CORONARY ANGIOPLASTY: Status: ACTIVE | Noted: 2023-10-20

## 2023-10-20 PROBLEM — G47.33 OBSTRUCTIVE SLEEP APNEA OF ADULT: Status: ACTIVE | Noted: 2023-10-20

## 2023-10-20 PROBLEM — I25.10 CAD S/P PERCUTANEOUS CORONARY ANGIOPLASTY: Status: ACTIVE | Noted: 2023-10-20

## 2023-10-20 PROBLEM — I10 ESSENTIAL HYPERTENSION, BENIGN: Status: ACTIVE | Noted: 2023-10-20

## 2023-10-20 PROBLEM — N40.0 BPH (BENIGN PROSTATIC HYPERPLASIA): Status: ACTIVE | Noted: 2023-10-20

## 2023-10-20 PROBLEM — R07.9 CHEST PAIN: Status: ACTIVE | Noted: 2023-10-20

## 2023-10-20 RX ORDER — CLOPIDOGREL BISULFATE 75 MG/1
75 TABLET ORAL DAILY
COMMUNITY
Start: 2023-10-13

## 2023-10-20 RX ORDER — NITROGLYCERIN 0.4 MG/1
0.4 TABLET SUBLINGUAL EVERY 5 MIN PRN
COMMUNITY
Start: 2023-10-19

## 2023-10-20 RX ORDER — CETIRIZINE HYDROCHLORIDE 10 MG/1
10 TABLET, CHEWABLE ORAL DAILY
COMMUNITY

## 2023-10-20 RX ORDER — METOPROLOL SUCCINATE 100 MG/1
100 TABLET, EXTENDED RELEASE ORAL DAILY
COMMUNITY
Start: 2023-07-20

## 2023-10-20 RX ORDER — TAMSULOSIN HYDROCHLORIDE 0.4 MG/1
0.4 CAPSULE ORAL DAILY
COMMUNITY
Start: 2023-07-20

## 2023-10-20 RX ORDER — ATORVASTATIN CALCIUM 40 MG/1
40 TABLET, FILM COATED ORAL DAILY
COMMUNITY
Start: 2023-07-20

## 2023-10-20 RX ORDER — ASPIRIN 81 MG/1
81 TABLET ORAL DAILY
COMMUNITY

## 2023-10-20 NOTE — TELEPHONE ENCOUNTER
DC PLANNIN YRS OLD FEMALE PATIENT ADMITTED FROM ER  WITH A DX OF UNCONTROLLED DIABETES. PT HAS A 
HISTORY OF DM. PT IS HOMELESS USES WHEELCHAIR TO AMBULATE.  ON ADMISSION ,REGULAR 
INSULIN GIVEN CONTINUE HOME MEDS DC PLAN SS EVALUATE FOR HOMELESSNESS FOR SAFE DISCHARGE. CM 
TO FOLLOW 

-------------------------------------------------------------------------------

Addendum: 19 at 1028 by Mary Rudolph CM

-------------------------------------------------------------------------------

DC PLANNING:

PT EVALUATION DONE AND RECOMMEND SNF FOR PT .PATIENT AGREED TO GO TO SNF CURRENTLY PT IS 
HOMELESS.  HELPING WITH DISCHARGE NEEDS.  TODAY RECEIVED INSULIN COVERAGE 
MG LEVEL 1.5 MAG RIDER GIVEN CM TO FOLLOW 

-------------------------------------------------------------------------------

Addendum: 19 at 1505 by aMry Rudolph CM

-------------------------------------------------------------------------------

DC PLANNING

 PATIENT AGREED TO GO TO ANY SNF TO GET PHYSICAL THERAPY. FAXED [facility]  [phone] 
, SPOKE WITH BOB , [facility] CAN ACCEPT PT AND CAN GO TO ROOM 104A ACCEPTING  WILL BE DR WEILI . DC PLAN FOR TOMORROW 19 CM TO FOLLOW 

-------------------------------------------------------------------------------

Addendum: 19 at 1101 by Mary Rudolph 

-------------------------------------------------------------------------------

DC PLANNING

[place] [facility] [place] [facility] WILL ARRANGE TRANSPORT  TIME 
1:30 PM NOTIFIED TAHIRA BRENNAN Pt called stating he has been having R shoulder pain. Pt had stent in June with Dr. Valentin. Pt states nitroglycerin relieves the pain. Pt asking for recommendations.

## 2023-10-25 ENCOUNTER — HOSPITAL ENCOUNTER (EMERGENCY)
Facility: HOSPITAL | Age: 58
Discharge: HOME | End: 2023-10-25
Payer: MEDICAID

## 2023-10-25 VITALS
DIASTOLIC BLOOD PRESSURE: 87 MMHG | RESPIRATION RATE: 18 BRPM | HEART RATE: 71 BPM | OXYGEN SATURATION: 95 % | TEMPERATURE: 98.1 F | BODY MASS INDEX: 36.96 KG/M2 | HEIGHT: 66 IN | WEIGHT: 230 LBS | SYSTOLIC BLOOD PRESSURE: 138 MMHG

## 2023-10-25 DIAGNOSIS — R42 VERTIGO: Primary | ICD-10-CM

## 2023-10-25 LAB
ALBUMIN SERPL BCP-MCNC: 4.2 G/DL (ref 3.4–5)
ALP SERPL-CCNC: 70 U/L (ref 33–120)
ALT SERPL W P-5'-P-CCNC: 17 U/L (ref 10–52)
ANION GAP SERPL CALC-SCNC: 9 MMOL/L (ref 10–20)
AST SERPL W P-5'-P-CCNC: 13 U/L (ref 9–39)
BASOPHILS # BLD AUTO: 0.05 X10*3/UL (ref 0–0.1)
BASOPHILS NFR BLD AUTO: 0.8 %
BILIRUB SERPL-MCNC: 0.5 MG/DL (ref 0–1.2)
BNP SERPL-MCNC: 9 PG/ML (ref 0–99)
BUN SERPL-MCNC: 12 MG/DL (ref 6–23)
CALCIUM SERPL-MCNC: 9.6 MG/DL (ref 8.6–10.3)
CARDIAC TROPONIN I PNL SERPL HS: <3 NG/L (ref 0–20)
CHLORIDE SERPL-SCNC: 105 MMOL/L (ref 98–107)
CO2 SERPL-SCNC: 24 MMOL/L (ref 21–32)
CREAT SERPL-MCNC: 1.28 MG/DL (ref 0.5–1.3)
EOSINOPHIL # BLD AUTO: 0.32 X10*3/UL (ref 0–0.7)
EOSINOPHIL NFR BLD AUTO: 5.3 %
ERYTHROCYTE [DISTWIDTH] IN BLOOD BY AUTOMATED COUNT: 12.9 % (ref 11.5–14.5)
GFR SERPL CREATININE-BSD FRML MDRD: 66 ML/MIN/1.73M*2
GLUCOSE SERPL-MCNC: 126 MG/DL (ref 74–99)
HCT VFR BLD AUTO: 46.2 % (ref 41–52)
HGB BLD-MCNC: 15.9 G/DL (ref 13.5–17.5)
IMM GRANULOCYTES # BLD AUTO: 0.01 X10*3/UL (ref 0–0.7)
IMM GRANULOCYTES NFR BLD AUTO: 0.2 % (ref 0–0.9)
INR PPP: 1.1 (ref 0.9–1.1)
LYMPHOCYTES # BLD AUTO: 1.33 X10*3/UL (ref 1.2–4.8)
LYMPHOCYTES NFR BLD AUTO: 21.9 %
MAGNESIUM SERPL-MCNC: 1.99 MG/DL (ref 1.6–2.4)
MCH RBC QN AUTO: 30.5 PG (ref 26–34)
MCHC RBC AUTO-ENTMCNC: 34.4 G/DL (ref 32–36)
MCV RBC AUTO: 89 FL (ref 80–100)
MONOCYTES # BLD AUTO: 0.39 X10*3/UL (ref 0.1–1)
MONOCYTES NFR BLD AUTO: 6.4 %
NEUTROPHILS # BLD AUTO: 3.96 X10*3/UL (ref 1.2–7.7)
NEUTROPHILS NFR BLD AUTO: 65.4 %
NRBC BLD-RTO: 0 /100 WBCS (ref 0–0)
PLATELET # BLD AUTO: 278 X10*3/UL (ref 150–450)
PMV BLD AUTO: 9.1 FL (ref 7.5–11.5)
POTASSIUM SERPL-SCNC: 4.4 MMOL/L (ref 3.5–5.3)
PROT SERPL-MCNC: 7 G/DL (ref 6.4–8.2)
PROTHROMBIN TIME: 12.2 SECONDS (ref 9.8–12.8)
RBC # BLD AUTO: 5.21 X10*6/UL (ref 4.5–5.9)
SODIUM SERPL-SCNC: 134 MMOL/L (ref 136–145)
WBC # BLD AUTO: 6.1 X10*3/UL (ref 4.4–11.3)

## 2023-10-25 PROCEDURE — 99283 EMERGENCY DEPT VISIT LOW MDM: CPT

## 2023-10-25 PROCEDURE — 83880 ASSAY OF NATRIURETIC PEPTIDE: CPT | Performed by: PHYSICIAN ASSISTANT

## 2023-10-25 PROCEDURE — 85610 PROTHROMBIN TIME: CPT | Performed by: PHYSICIAN ASSISTANT

## 2023-10-25 PROCEDURE — 99284 EMERGENCY DEPT VISIT MOD MDM: CPT

## 2023-10-25 PROCEDURE — 85025 COMPLETE CBC W/AUTO DIFF WBC: CPT | Performed by: PHYSICIAN ASSISTANT

## 2023-10-25 PROCEDURE — 84484 ASSAY OF TROPONIN QUANT: CPT | Performed by: PHYSICIAN ASSISTANT

## 2023-10-25 PROCEDURE — 36415 COLL VENOUS BLD VENIPUNCTURE: CPT | Performed by: PHYSICIAN ASSISTANT

## 2023-10-25 PROCEDURE — 83735 ASSAY OF MAGNESIUM: CPT | Performed by: PHYSICIAN ASSISTANT

## 2023-10-25 PROCEDURE — 2500000001 HC RX 250 WO HCPCS SELF ADMINISTERED DRUGS (ALT 637 FOR MEDICARE OP): Performed by: PHYSICIAN ASSISTANT

## 2023-10-25 PROCEDURE — 80053 COMPREHEN METABOLIC PANEL: CPT | Performed by: PHYSICIAN ASSISTANT

## 2023-10-25 RX ORDER — MECLIZINE HCL 12.5 MG 12.5 MG/1
25 TABLET ORAL ONCE
Status: COMPLETED | OUTPATIENT
Start: 2023-10-25 | End: 2023-10-25

## 2023-10-25 RX ORDER — MECLIZINE HYDROCHLORIDE 25 MG/1
25 TABLET ORAL 3 TIMES DAILY
Qty: 21 TABLET | Refills: 0 | Status: SHIPPED | OUTPATIENT
Start: 2023-10-25 | End: 2023-11-01

## 2023-10-25 RX ADMIN — MECLIZINE HYDROCHLORIDE 25 MG: 12.5 TABLET ORAL at 14:35

## 2023-10-25 ASSESSMENT — PAIN - FUNCTIONAL ASSESSMENT
PAIN_FUNCTIONAL_ASSESSMENT: 0-10
PAIN_FUNCTIONAL_ASSESSMENT: 0-10

## 2023-10-25 ASSESSMENT — PAIN SCALES - GENERAL
PAINLEVEL_OUTOF10: 0 - NO PAIN
PAINLEVEL_OUTOF10: 0 - NO PAIN

## 2023-10-25 ASSESSMENT — COLUMBIA-SUICIDE SEVERITY RATING SCALE - C-SSRS
1. IN THE PAST MONTH, HAVE YOU WISHED YOU WERE DEAD OR WISHED YOU COULD GO TO SLEEP AND NOT WAKE UP?: NO
6. HAVE YOU EVER DONE ANYTHING, STARTED TO DO ANYTHING, OR PREPARED TO DO ANYTHING TO END YOUR LIFE?: NO
2. HAVE YOU ACTUALLY HAD ANY THOUGHTS OF KILLING YOURSELF?: NO

## 2023-10-25 NOTE — Clinical Note
Cristian Lazaro was seen and treated in our emergency department on 10/25/2023.  He may return to work on 10/26/2023.       If you have any questions or concerns, please don't hesitate to call.      Cecelia Gotti PA-C

## 2023-10-25 NOTE — ED PROVIDER NOTES
HPI   Chief Complaint   Patient presents with    Dizziness     Increasing dizziness starting today, denies any other sx          History provided by:  Patient and significant other   used: No           58-year-old male past medical history hypertension, CAD, MINESH and anxiety presents for intermittent episodes of room spinning sensation that started in the middle the night last night 12 hours ago.  Worse with movement and resolves if he just sits still.  Denies history of cancer.  Denies fever, headache, vision changes, n/v, dysuria, SOB or CP    ROS negative unless otherwise stated in HPI                 [place] Coma Scale Score: 15                  Patient History   No past medical history on file.  No past surgical history on file.  No family history on file.  Social History     Tobacco Use    Smoking status: Not on file    Smokeless tobacco: Not on file   Substance Use Topics    Alcohol use: Not on file    Drug use: Not on file       Physical Exam   ED Triage Vitals [10/25/23 1326]   Temp Heart Rate Resp BP   36.7 °C (98.1 °F) 77 18 (!) 154/100      SpO2 Temp Source Heart Rate Source Patient Position   98 % Temporal Monitor --      BP Location FiO2 (%)     -- --       Physical Exam    General: alert, no distress, well nourished, talking in full and complete sentences  Skin: dry, intact, no rashes  Head: atraumatic  Eyes: EOMI, PERRLA, normal conjunctiva, no nystagmus  Throat: no stridor, no hot potato voice  Nose: nares patent  Mouth: mucous membranes moist  CV: +S1/S1  Respiratory: non labored breathing, lungs CTA, no wheezing, no retractions  Extremities: FROM X4, strength +5/5, pulses intact, capillary refill intact  Neuro: A&Ox3, CN 2-12 intact, ambulating without ataxia  Psych: cooperative, appropriate mood      ED Course & MDM   Diagnoses as of 10/25/23 1511   Vertigo     Labs Reviewed   COMPREHENSIVE METABOLIC PANEL - Abnormal       Result Value    Glucose 126 (*)     Sodium 134 (*)      Potassium 4.4      Chloride 105      Bicarbonate 24      Anion Gap 9 (*)     Urea Nitrogen 12      Creatinine 1.28      eGFR 66      Calcium 9.6      Albumin 4.2      Alkaline Phosphatase 70      Total Protein 7.0      AST 13      Bilirubin, Total 0.5      ALT 17     PROTIME-INR - Normal    Protime 12.2      INR 1.1     TROPONIN I, HIGH SENSITIVITY - Normal    Troponin I, High Sensitivity <3      Narrative:     Less than 99th percentile of normal range cutoff-  Female and children under 18 years old <14 ng/L; Male <21 ng/L: Negative  Repeat testing should be performed if clinically indicated.     Female and children under 18 years old 14-50 ng/L; Male 21-50 ng/L:  Consistent with possible cardiac damage and possible increased clinical   risk. Serial measurements may help to assess extent of myocardial damage.     >50 ng/L: Consistent with cardiac damage, increased clinical risk and  myocardial infarction. Serial measurements may help assess extent of   myocardial damage.      NOTE: Children less than 1 year old may have higher baseline troponin   levels and results should be interpreted in conjunction with the overall   clinical context.     NOTE: Troponin I testing is performed using a different   testing methodology at [facility] than at other   [facility]. Direct result comparisons should only   be made within the same method.   B-TYPE NATRIURETIC PEPTIDE - Normal    BNP 9      Narrative:        <100 pg/mL - Heart failure unlikely  100-299 pg/mL - Intermediate probability of acute heart                  failure exacerbation. Correlate with clinical                  context and patient history.    >=300 pg/mL - Heart Failure likely. Correlate with clinical                  context and patient history.    BNP testing is performed using different testing methodology at [facility] than at other [facility]. Direct result comparisons should only be made within the same method.       MAGNESIUM - Normal    Magnesium 1.99     CBC WITH AUTO DIFFERENTIAL    WBC 6.1      nRBC 0.0      RBC 5.21      Hemoglobin 15.9      Hematocrit 46.2      MCV 89      MCH 30.5      MCHC 34.4      RDW 12.9      Platelets 278      MPV 9.1      Neutrophils % 65.4      Immature Granulocytes %, Automated 0.2      Lymphocytes % 21.9      Monocytes % 6.4      Eosinophils % 5.3      Basophils % 0.8      Neutrophils Absolute 3.96      Immature Granulocytes Absolute, Automated 0.01      Lymphocytes Absolute 1.33      Monocytes Absolute 0.39      Eosinophils Absolute 0.32      Basophils Absolute 0.05        No orders to display         Medical Decision Making  Patient medicated with meclizine as sounds like vertigo.  EKG NSR at a rate of 70.  No significant abnormalities.  Patient gets up and walks around and states that he is no longer dizzy.  Likely vertigo and he is to follow-up with family doctor and get a prescription for meclizine.  Afebrile, not tachycardic, not tachypneic, tolerating PO, non toxic appearing and ambulating at baseline at discharge. Hemodynamically intact. All questions answered. Educated on SE of meds. Patient in agreement with treatment.      Procedure  Procedures     Cecelia Gotti PA-C  10/25/23 1522       Cecelia Gotti PA-C  10/25/23 1521

## 2023-10-26 ENCOUNTER — TELEPHONE (OUTPATIENT)
Dept: FAMILY MEDICINE CLINIC | Age: 58
End: 2023-10-26

## 2023-10-26 NOTE — TELEPHONE ENCOUNTER
Per Dr. Brain Valentin schedule patient for nuclear stress test, hold BetaBlocker am of stress test.  Call placed to patient to advise.  Patient stated that he was in ER yesterday for dizziness, diagnosed with Vertigo.  They did cardiac workup also which was negative per the patient. The should pain he is having comes and goes with activity and is reproducible with movement and touch.  Does he still need nuclear stress test?  Printed for Dr. Brain Valentin

## 2023-10-26 NOTE — TELEPHONE ENCOUNTER
10/26  Per Dr. Brain Valentin , no stress test necessary at this time.  Call returned to patient and advised.

## 2023-10-26 NOTE — TELEPHONE ENCOUNTER
----- Message from Pollo Dowd sent at 10/25/2023  3:08 PM EDT -----  Subject: Appointment Request    Reason for Call: New Patient/New to Provider Appointment needed: Routine   ED Follow Up Visit    QUESTIONS    Reason for appointment request? No appointments available during search     Additional Information for Provider? Patient needs appt on a Tuesday or   Wednesday to establish care and get ER follow up visit for vertigo.  Please   call back with appt.  ---------------------------------------------------------------------------  --------------  CALL BACK INFO  [phone]; OK to leave message on voicemail  ---------------------------------------------------------------------------  --------------  SCRIPT ANSWERS

## 2023-11-07 ENCOUNTER — OFFICE VISIT (OUTPATIENT)
Dept: FAMILY MEDICINE CLINIC | Age: 58
End: 2023-11-07
Payer: MEDICAID

## 2023-11-07 VITALS
BODY MASS INDEX: 36.26 KG/M2 | WEIGHT: 225.6 LBS | HEIGHT: 66 IN | DIASTOLIC BLOOD PRESSURE: 80 MMHG | SYSTOLIC BLOOD PRESSURE: 132 MMHG | HEART RATE: 61 BPM | OXYGEN SATURATION: 96 %

## 2023-11-07 DIAGNOSIS — M17.12 LOCALIZED OSTEOARTHRITIS OF LEFT KNEE: Primary | ICD-10-CM

## 2023-11-07 DIAGNOSIS — M79.601 MUSCULOSKELETAL PAIN OF RIGHT UPPER EXTREMITY: ICD-10-CM

## 2023-11-07 DIAGNOSIS — N40.1 BENIGN PROSTATIC HYPERPLASIA WITH LOWER URINARY TRACT SYMPTOMS, SYMPTOM DETAILS UNSPECIFIED: ICD-10-CM

## 2023-11-07 DIAGNOSIS — E78.5 HYPERLIPIDEMIA, UNSPECIFIED HYPERLIPIDEMIA TYPE: ICD-10-CM

## 2023-11-07 DIAGNOSIS — I25.119 CORONARY ARTERY DISEASE INVOLVING NATIVE CORONARY ARTERY OF NATIVE HEART WITH ANGINA PECTORIS (HCC): ICD-10-CM

## 2023-11-07 PROBLEM — F41.9 ANXIETY: Status: ACTIVE | Noted: 2023-10-20

## 2023-11-07 PROBLEM — N40.0 BPH (BENIGN PROSTATIC HYPERPLASIA): Status: ACTIVE | Noted: 2023-10-20

## 2023-11-07 PROCEDURE — 3079F DIAST BP 80-89 MM HG: CPT | Performed by: FAMILY MEDICINE

## 2023-11-07 PROCEDURE — 3075F SYST BP GE 130 - 139MM HG: CPT | Performed by: FAMILY MEDICINE

## 2023-11-07 PROCEDURE — 20610 DRAIN/INJ JOINT/BURSA W/O US: CPT | Performed by: FAMILY MEDICINE

## 2023-11-07 PROCEDURE — 99214 OFFICE O/P EST MOD 30 MIN: CPT | Performed by: FAMILY MEDICINE

## 2023-11-07 RX ORDER — FEXOFENADINE HCL 180 MG/1
180 TABLET ORAL DAILY
COMMUNITY
Start: 2023-09-15

## 2023-11-07 RX ORDER — ASPIRIN 81 MG/1
81 TABLET ORAL DAILY
COMMUNITY
End: 2023-11-07 | Stop reason: SDUPTHER

## 2023-11-07 RX ORDER — NITROGLYCERIN 0.4 MG/1
TABLET SUBLINGUAL
COMMUNITY
Start: 2023-10-19

## 2023-11-07 RX ORDER — TAMSULOSIN HYDROCHLORIDE 0.4 MG/1
0.8 CAPSULE ORAL DAILY
Qty: 180 CAPSULE | Refills: 3 | Status: SHIPPED | OUTPATIENT
Start: 2023-11-07

## 2023-11-07 RX ORDER — ATORVASTATIN CALCIUM 40 MG/1
40 TABLET, FILM COATED ORAL DAILY
COMMUNITY
Start: 2023-07-20 | End: 2023-11-07 | Stop reason: SDUPTHER

## 2023-11-07 RX ORDER — ATORVASTATIN CALCIUM 40 MG/1
40 TABLET, FILM COATED ORAL DAILY
Qty: 90 TABLET | Refills: 3 | Status: SHIPPED | OUTPATIENT
Start: 2023-11-07

## 2023-11-07 RX ORDER — TAMSULOSIN HYDROCHLORIDE 0.4 MG/1
0.4 CAPSULE ORAL DAILY
COMMUNITY
Start: 2023-07-20 | End: 2023-11-07 | Stop reason: SDUPTHER

## 2023-11-07 RX ORDER — CLOPIDOGREL BISULFATE 75 MG/1
75 TABLET ORAL DAILY
Qty: 90 TABLET | Refills: 3 | Status: SHIPPED | OUTPATIENT
Start: 2023-11-07

## 2023-11-07 RX ORDER — ASPIRIN 81 MG/1
81 TABLET ORAL DAILY
Qty: 90 TABLET | Refills: 3 | Status: SHIPPED | OUTPATIENT
Start: 2023-11-07

## 2023-11-07 RX ORDER — CLOPIDOGREL BISULFATE 75 MG/1
TABLET ORAL
COMMUNITY
Start: 2023-09-09 | End: 2023-11-07

## 2023-11-07 RX ORDER — CLOPIDOGREL BISULFATE 75 MG/1
TABLET ORAL
Qty: 30 TABLET | Refills: 3 | Status: CANCELLED | OUTPATIENT
Start: 2023-11-07

## 2023-11-07 RX ORDER — TRIAMCINOLONE ACETONIDE 40 MG/ML
40 INJECTION, SUSPENSION INTRA-ARTICULAR; INTRAMUSCULAR ONCE
Status: COMPLETED | OUTPATIENT
Start: 2023-11-07 | End: 2023-11-07

## 2023-11-07 RX ADMIN — TRIAMCINOLONE ACETONIDE 40 MG: 40 INJECTION, SUSPENSION INTRA-ARTICULAR; INTRAMUSCULAR at 14:01

## 2023-11-07 SDOH — ECONOMIC STABILITY: FOOD INSECURITY: WITHIN THE PAST 12 MONTHS, YOU WORRIED THAT YOUR FOOD WOULD RUN OUT BEFORE YOU GOT MONEY TO BUY MORE.: NEVER TRUE

## 2023-11-07 SDOH — ECONOMIC STABILITY: FOOD INSECURITY: WITHIN THE PAST 12 MONTHS, THE FOOD YOU BOUGHT JUST DIDN'T LAST AND YOU DIDN'T HAVE MONEY TO GET MORE.: NEVER TRUE

## 2023-11-07 SDOH — ECONOMIC STABILITY: HOUSING INSECURITY
IN THE LAST 12 MONTHS, WAS THERE A TIME WHEN YOU DID NOT HAVE A STEADY PLACE TO SLEEP OR SLEPT IN A SHELTER (INCLUDING NOW)?: NO

## 2023-11-07 SDOH — ECONOMIC STABILITY: INCOME INSECURITY: HOW HARD IS IT FOR YOU TO PAY FOR THE VERY BASICS LIKE FOOD, HOUSING, MEDICAL CARE, AND HEATING?: NOT HARD AT ALL

## 2023-11-07 ASSESSMENT — ENCOUNTER SYMPTOMS
SHORTNESS OF BREATH: 0
SORE THROAT: 0
CONSTIPATION: 0
RHINORRHEA: 0
COUGH: 0
WHEEZING: 0
DIARRHEA: 0
ABDOMINAL PAIN: 0

## 2023-11-07 ASSESSMENT — PATIENT HEALTH QUESTIONNAIRE - PHQ9
SUM OF ALL RESPONSES TO PHQ QUESTIONS 1-9: 0
2. FEELING DOWN, DEPRESSED OR HOPELESS: 0
SUM OF ALL RESPONSES TO PHQ QUESTIONS 1-9: 0
1. LITTLE INTEREST OR PLEASURE IN DOING THINGS: 0
SUM OF ALL RESPONSES TO PHQ9 QUESTIONS 1 & 2: 0

## 2023-11-07 NOTE — PROGRESS NOTES
Cervical: No cervical adenopathy. Skin:     General: Skin is warm and dry. Neurological:      Mental Status: He is alert and oriented to person, place, and time. Psychiatric:         Behavior: Behavior normal.         Assessment/Plan:  66 y.o. male here mainly for multiple issues:  - already had plenty of recent labs so will check next year  - RUE pain: appears to be muscle injury; offered PT but he plans to give this more time  - [facility]: established with cardiology; meds refilled today  - L knee pain: OA; steroid injection today  - BPH: still with symptoms overnight; increased to the flomax to 0.8mg nightly    Procedure: L knee Steroid Injection  Discussed risks/benefits of procedure. After verbal consent, timeout was performed. Area prepped in the usual fashion. 4ml of 2% lidocaine + 40mg kenalog injected into joint via 23G needle. Patient tolerated the procedure without complication. Pt instructed on post-procedure wound care. Diagnosis Orders   1. Localized osteoarthritis of left knee  20610 - TN DRAIN/INJECT LARGE JOINT/BURSA    triamcinolone acetonide (KENALOG-40) injection 40 mg      2. Hyperlipidemia, unspecified hyperlipidemia type  atorvastatin (LIPITOR) 40 MG tablet      3. Coronary artery disease involving native coronary artery of native heart with angina pectoris (HCC)  aspirin 81 MG EC tablet    atorvastatin (LIPITOR) 40 MG tablet    clopidogrel (PLAVIX) 75 MG tablet      4. Benign prostatic hyperplasia with lower urinary tract symptoms, symptom details unspecified  tamsulosin (FLOMAX) 0.4 MG capsule      5.  Musculoskeletal pain of right upper extremity             Return in about 1 year (around 11/7/2024) for annual exam.    Silvia Seymour MD

## 2023-11-12 ENCOUNTER — HOSPITAL ENCOUNTER (OUTPATIENT)
Dept: CARDIOLOGY | Facility: HOSPITAL | Age: 58
Discharge: HOME | End: 2023-11-12
Payer: MEDICAID

## 2023-11-12 PROCEDURE — 93005 ELECTROCARDIOGRAM TRACING: CPT

## 2023-12-28 ENCOUNTER — OFFICE VISIT (OUTPATIENT)
Dept: CARDIOLOGY | Facility: CLINIC | Age: 58
End: 2023-12-28
Payer: MEDICAID

## 2023-12-28 VITALS
WEIGHT: 225.4 LBS | BODY MASS INDEX: 37.55 KG/M2 | HEIGHT: 65 IN | DIASTOLIC BLOOD PRESSURE: 82 MMHG | HEART RATE: 88 BPM | SYSTOLIC BLOOD PRESSURE: 148 MMHG

## 2023-12-28 DIAGNOSIS — I25.10 CAD S/P PERCUTANEOUS CORONARY ANGIOPLASTY: ICD-10-CM

## 2023-12-28 DIAGNOSIS — E78.2 MIXED HYPERLIPIDEMIA: ICD-10-CM

## 2023-12-28 DIAGNOSIS — I10 ESSENTIAL HYPERTENSION, BENIGN: Primary | ICD-10-CM

## 2023-12-28 DIAGNOSIS — G47.33 OBSTRUCTIVE SLEEP APNEA OF ADULT: ICD-10-CM

## 2023-12-28 DIAGNOSIS — N40.0 BENIGN PROSTATIC HYPERPLASIA, UNSPECIFIED WHETHER LOWER URINARY TRACT SYMPTOMS PRESENT: ICD-10-CM

## 2023-12-28 DIAGNOSIS — Z98.61 CAD S/P PERCUTANEOUS CORONARY ANGIOPLASTY: ICD-10-CM

## 2023-12-28 DIAGNOSIS — F41.9 ANXIETY: ICD-10-CM

## 2023-12-28 DIAGNOSIS — Z78.9 NEVER SMOKED CIGARETTES: ICD-10-CM

## 2023-12-28 PROCEDURE — 3077F SYST BP >= 140 MM HG: CPT | Performed by: INTERNAL MEDICINE

## 2023-12-28 PROCEDURE — 99214 OFFICE O/P EST MOD 30 MIN: CPT | Performed by: INTERNAL MEDICINE

## 2023-12-28 PROCEDURE — 1036F TOBACCO NON-USER: CPT | Performed by: INTERNAL MEDICINE

## 2023-12-28 PROCEDURE — 3079F DIAST BP 80-89 MM HG: CPT | Performed by: INTERNAL MEDICINE

## 2023-12-28 PROCEDURE — 3008F BODY MASS INDEX DOCD: CPT | Performed by: INTERNAL MEDICINE

## 2023-12-28 RX ORDER — AMLODIPINE BESYLATE 5 MG/1
5 TABLET ORAL DAILY
Qty: 30 TABLET | Refills: 11 | Status: SHIPPED | OUTPATIENT
Start: 2023-12-28 | End: 2024-12-27

## 2023-12-28 RX ORDER — MINERAL OIL
180 ENEMA (ML) RECTAL DAILY
COMMUNITY
Start: 2023-11-15

## 2023-12-28 RX ORDER — IBUPROFEN 800 MG/1
800 TABLET ORAL 3 TIMES DAILY
COMMUNITY
Start: 2023-07-21

## 2023-12-28 NOTE — PROGRESS NOTES
"CARDIOLOGY OFFICE VISIT      CHIEF COMPLAINT      HISTORY OF PRESENT ILLNESS  The patient states that he has been doing well as far as he is concerned.  He states that once a while he will have \"heartburn\".  This usually occurs when he is sitting around not doing anything.  He states this is retrosternal location.  Takes 1 nitro and he gets relief in a few minutes.  He might of had 1 episode recently when he was doing physical activities.  His blood pressure has been running a little bit elevated at home.  I will start him on amlodipine 5 mg a day.  He will let me know if the has any problems with this.  He will let me know if has any increase in his \"heartburn\".  He denies dyspnea with activities.  He denies palpitation syncope.  He denies any problem with his current medication.        Impression:  Coronary Artery Disease, no angina   Unstable Angina. June 2023  PCI TIFFANY to Distal RCA. June 2023  Hypertension  Hyperlipidemia, Mixed  BPH  Anxiety  Significant Family History CAD  Obesity  Obstructive Sleep Apnea, no CPAP    Please excuse any errors in grammar or translation related to this dictation. Voice recognition software was utilized to prepare this document.     Past Medical History  Past Medical History:   Diagnosis Date    Hypertension        Social History  Social History     Tobacco Use    Smoking status: Never    Smokeless tobacco: Never   Substance Use Topics    Alcohol use: Never    Drug use: Never       Family History     Family History   Problem Relation Name Age of Onset    Hyperlipidemia Mother      Hypertension Mother      Coronary artery disease Mother      Diabetes Mother      Stroke Father      Dementia Father          Allergies:  Allergies   Allergen Reactions    Hydrocodone Nausea/vomiting        Outpatient Medications:  Current Outpatient Medications   Medication Instructions    aspirin 81 mg, oral, Daily    atorvastatin (LIPITOR) 40 mg, oral, Daily    cetirizine (ZYRTEC) 10 mg, oral, Daily "    clopidogrel (PLAVIX) 75 mg, oral, Daily    fexofenadine (ALLEGRA) 180 mg, oral, Daily    ibuprofen 800 mg, oral, 3 times daily, PRN    metoprolol succinate XL (TOPROL-XL) 100 mg, oral, Daily    nitroglycerin (Nitrostat) 0.4 mg SL tablet 1 tablet (0.4 mg) every 5 minutes if needed for chest pain.    tamsulosin (FLOMAX) 0.4 mg, oral, Daily          REVIEW OF SYSTEMS  Review of Systems   All other systems reviewed and are negative.        VITALS  Vitals:    12/28/23 1308   BP: 148/82   Pulse: 88       PHYSICAL EXAM  Constitutional:       Appearance: Healthy appearance. Not in distress.   Neck:      Vascular: No JVR. JVD normal.   Pulmonary:      Effort: Pulmonary effort is normal.      Breath sounds: Normal breath sounds. No wheezing. No rhonchi. No rales.   Chest:      Chest wall: Not tender to palpatation.   Cardiovascular:      PMI at left midclavicular line. Normal rate. Regular rhythm. Normal S1. Normal S2.       Murmurs: There is no murmur.      No gallop.  No click. No rub.   Pulses:     Intact distal pulses.   Edema:     Peripheral edema absent.   Abdominal:      General: Bowel sounds are normal.      Palpations: Abdomen is soft.      Tenderness: There is no abdominal tenderness.   Musculoskeletal: Normal range of motion.         General: No tenderness. Skin:     General: Skin is warm and dry.   Neurological:      General: No focal deficit present.      Mental Status: Alert and oriented to person, place and time.           ASSESSMENT AND PLAN  Diagnoses and all orders for this visit:  CAD S/P percutaneous coronary angioplasty  Essential hypertension, benign  Mixed hyperlipidemia  Anxiety  Benign prostatic hyperplasia, unspecified whether lower urinary tract symptoms present  Obstructive sleep apnea of adult  BMI 37.0-37.9, adult  Never smoked cigarettes      [unfilled]

## 2024-01-30 RX ORDER — FEXOFENADINE HCL 180 MG/1
180 TABLET ORAL DAILY
Qty: 30 TABLET | Refills: 3 | Status: SHIPPED | OUTPATIENT
Start: 2024-01-30

## 2024-01-30 NOTE — TELEPHONE ENCOUNTER
Comments:     Last Office Visit (last PCP visit):   11/7/2023    Next Visit Date:  Future Appointments   Date Time Provider Department Center   2/1/2024  3:30 PM Tomas Bucio MD [facility]   11/7/2024  1:30 PM Tomas Bucio MD [facility]       **If hasn't been seen in over a year OR hasn't followed up according to last diabetes/ADHD visit, make appointment for patient before sending refill to provider.    Rx requested:  Requested Prescriptions     Pending Prescriptions Disp Refills    fexofenadine (ALLEGRA) 180 MG tablet 30 tablet 3     Sig: Take 1 tablet by mouth daily

## 2024-03-29 ENCOUNTER — OFFICE VISIT (OUTPATIENT)
Dept: FAMILY MEDICINE CLINIC | Age: 59
End: 2024-03-29

## 2024-03-29 ENCOUNTER — TELEPHONE (OUTPATIENT)
Dept: FAMILY MEDICINE CLINIC | Age: 59
End: 2024-03-29

## 2024-03-29 VITALS
BODY MASS INDEX: 36.64 KG/M2 | OXYGEN SATURATION: 97 % | DIASTOLIC BLOOD PRESSURE: 86 MMHG | HEIGHT: 66 IN | SYSTOLIC BLOOD PRESSURE: 138 MMHG | WEIGHT: 228 LBS | HEART RATE: 88 BPM

## 2024-03-29 DIAGNOSIS — L91.8 INFLAMED SKIN TAG: Primary | ICD-10-CM

## 2024-03-29 DIAGNOSIS — E66.01 CLASS 2 SEVERE OBESITY DUE TO EXCESS CALORIES WITH SERIOUS COMORBIDITY AND BODY MASS INDEX (BMI) OF 36.0 TO 36.9 IN ADULT (HCC): ICD-10-CM

## 2024-03-29 ASSESSMENT — ENCOUNTER SYMPTOMS
CONSTIPATION: 0
COUGH: 0
RHINORRHEA: 0
ABDOMINAL PAIN: 0
DIARRHEA: 0
SORE THROAT: 0
SHORTNESS OF BREATH: 0
WHEEZING: 0

## 2024-03-29 ASSESSMENT — PATIENT HEALTH QUESTIONNAIRE - PHQ9
2. FEELING DOWN, DEPRESSED OR HOPELESS: NOT AT ALL
SUM OF ALL RESPONSES TO PHQ QUESTIONS 1-9: 0
1. LITTLE INTEREST OR PLEASURE IN DOING THINGS: NOT AT ALL
SUM OF ALL RESPONSES TO PHQ QUESTIONS 1-9: 0
SUM OF ALL RESPONSES TO PHQ9 QUESTIONS 1 & 2: 0

## 2024-03-29 NOTE — PROGRESS NOTES
[facility]  [address]  [facility] [address]  Dept: [phone]  Dept Fax: [phone]  Loc: [phone]     Chief Complaint  Chief Complaint   Patient presents with    Skin Tag    Orders     Would like to discuss weight loss surgery. Has tried appetite suppressant gummies; advertised on tv he states and he gained 4 lbs.       HPI:  58 y.o.male who presents for the following:  (Related to [place] former NP's )    Skin: irritated skin tags at the L inner thigh for years; wants to get rid of these    Wt management: Body mass index is 36.8 kg/m². 228 lbs    Hx of DENEEN; had cardiac stents placed recently; having trouble losing weight; would like a med to help    Breakfast: grits  Lunch: sandwich  Dinner: wings, pizza  Snacks: no snack  Drinks: water, pop (3/day 20oz); coffee black  Activity: none       Review of Systems   Constitutional:  Negative for chills and fever.   HENT:  Negative for congestion, rhinorrhea and sore throat.    Respiratory:  Negative for cough, shortness of breath and wheezing.    Gastrointestinal:  Negative for abdominal pain, constipation and diarrhea.   Endocrine: Negative for polydipsia and polyuria.   Genitourinary:  Negative for dysuria, frequency and urgency.   Neurological:  Negative for syncope, light-headedness, numbness and headaches.   Psychiatric/Behavioral:  Negative for sleep disturbance. The patient is not nervous/anxious.        Past Medical History:   Diagnosis Date    ADHD (attention deficit hyperactivity disorder)     Depression     GERD (gastroesophageal reflux disease)     History of blood transfusion     Hyperlipidemia     Hypertension     S/P left heart catheterization by percutaneous approach 2000    Sleep apnea     reports noncompliance with machine      Past Surgical History:   Procedure Laterality Date    ANKLE SURGERY Right     fixation    ARM SURGERY Right

## 2024-03-29 NOTE — TELEPHONE ENCOUNTER
----- Message from LaAviva Brian sent at 3/29/2024  9:41 AM EDT -----  Subject: Message to Provider    QUESTIONS  Information for Provider? Pt would like to have a couple of skin tags   removed off his left inner thigh up by his testicle area. Please contact   Pt to come in and have those removed if possible. Pt would also like to   talk to PCP about weight loss surgery.   ---------------------------------------------------------------------------  --------------  CALL BACK INFO  [phone]; OK to leave message on voicemail,OK to respond with electronic   message via [employer] portal (only for patients who have registered [employer]   account)  ---------------------------------------------------------------------------  --------------  SCRIPT ANSWERS  Relationship to Patient? Self  (Is the patient requesting to see the provider for a procedure?)? Yes

## 2024-04-29 ENCOUNTER — PREP FOR PROCEDURE (OUTPATIENT)
Dept: BARIATRICS/WEIGHT MGMT | Age: 59
End: 2024-04-29

## 2024-04-29 ENCOUNTER — OFFICE VISIT (OUTPATIENT)
Dept: BARIATRICS/WEIGHT MGMT | Age: 59
End: 2024-04-29
Payer: MEDICAID

## 2024-04-29 VITALS
SYSTOLIC BLOOD PRESSURE: 128 MMHG | WEIGHT: 229.4 LBS | OXYGEN SATURATION: 95 % | HEART RATE: 88 BPM | DIASTOLIC BLOOD PRESSURE: 86 MMHG | HEIGHT: 65 IN | BODY MASS INDEX: 38.22 KG/M2

## 2024-04-29 DIAGNOSIS — E66.01 SEVERE OBESITY (BMI 35.0-39.9) WITH COMORBIDITY (HCC): ICD-10-CM

## 2024-04-29 DIAGNOSIS — E66.01 SEVERE OBESITY (BMI 35.0-39.9) WITH COMORBIDITY (HCC): Primary | ICD-10-CM

## 2024-04-29 DIAGNOSIS — E78.5 HYPERLIPIDEMIA, UNSPECIFIED HYPERLIPIDEMIA TYPE: ICD-10-CM

## 2024-04-29 DIAGNOSIS — E66.01 MORBID OBESITY (HCC): ICD-10-CM

## 2024-04-29 DIAGNOSIS — G47.33 OBSTRUCTIVE SLEEP APNEA SYNDROME: ICD-10-CM

## 2024-04-29 LAB — TSH REFLEX: 1.7 UIU/ML (ref 0.44–3.86)

## 2024-04-29 PROCEDURE — 99205 OFFICE O/P NEW HI 60 MIN: CPT | Performed by: SURGERY

## 2024-04-29 PROCEDURE — 3079F DIAST BP 80-89 MM HG: CPT | Performed by: SURGERY

## 2024-04-29 PROCEDURE — 3074F SYST BP LT 130 MM HG: CPT | Performed by: SURGERY

## 2024-04-29 RX ORDER — POLYETHYLENE GLYCOL 3350, SODIUM SULFATE ANHYDROUS, SODIUM BICARBONATE, SODIUM CHLORIDE, POTASSIUM CHLORIDE 236; 22.74; 6.74; 5.86; 2.97 G/4L; G/4L; G/4L; G/4L; G/4L
4 POWDER, FOR SOLUTION ORAL ONCE
Qty: 4000 ML | Refills: 0 | Status: SHIPPED | OUTPATIENT
Start: 2024-04-29 | End: 2024-04-29

## 2024-04-29 NOTE — PROGRESS NOTES
Hyperlipidemia, unspecified hyperlipidemia type       who reports multiple episodes of repeat weight-loss and re-gain with various programmatic weight loss programs such as Weight Watchers, Soraya Mason, and TOPS.     We discussed that the most effective treatment for morbid obesity is bariatric surgery.  We discussed the risks and benefits of laparoscopic or robotic Jaime-en-Y gastric bypass.  We discussed that over 80% of patients undergoing gastric bypass are able to maintain anywhere from half to all of her excess weight off and studies that exceed 25 years.  We discussed that there are risks for infection, bleeding, pain, need for reoperation, death, and vitamin and mineral deficiencies associated with the surgery.  We discussed that nicotine use is contraindicated after gastric bypass due to the risk of marginal ulcer formation.    We also discussed the risks and benefits of laparoscopic or robotic sleeve gastrectomy.  We discussed that most sleeve patients lose about 60 to 70% of their excess weight in a year and that review of multiple studies of 7 years in length reveal an average of 27% of sleeve patients regaining their lost weight and 19% requiring revision either for lack of weight loss or severe GERD.We discussed that there are risks for infection, bleeding, pain, need for reoperation, death, and vitamin and mineral deficiencies associated with the surgery.     We discussed that if a hiatal hernia is observed at surgery, we would repair the hiatal hernia to avoid potential obstruction in the case of gastric bypass or severe GERD in the case of sleeve or duodenal switch.   We discussed the risks and benefits of laparoscopic or robotic hiatal hernia repair.  The risks include infection, bleeding, pain, possible diarrhea, need for re-operation, blood clotting, or cardiovascular problems.    We also discussed the risks and benefits of screening upper endoscopy to assess the degree of esophagitis, possible

## 2024-04-29 NOTE — H&P (VIEW-ONLY)
Surgery Consultation    Patient Name:  :  Hospital Day:   Augusto Jeffery 1965 [unfilled]     Date of Service: 2024    Subjective:      History of Present Illness:    Augusto Jeffery  is a 58 y.o. male referred by Dr. Bucio for morbid obesity.  Augusto Jeffery reports multiple episodes of weight loss and regain after multiple diet and exercise programs.    Augusto is claustrophobic and refuses to use a face mask or nose piece for DENEEN.  He has had 4 general anesthesia experiences without any respiratory issue post operatively.    He has acid reflux sx.  Despite a prolonged hx of obesity, he has not had a screening colonoscopy to rule out polyps or colon CA.    Augusto has a cardiac stent and is on Plavix and aspirin.    Past Medical History:   Diagnosis Date    ADHD (attention deficit hyperactivity disorder)     Depression     GERD (gastroesophageal reflux disease)     History of blood transfusion     Hyperlipidemia     Hypertension     S/P left heart catheterization by percutaneous approach     Sleep apnea     reports noncompliance with machine     Past Surgical History:   Procedure Laterality Date    ANKLE SURGERY Right     fixation    ARM SURGERY Right     laceration repair, tendon repair     CHOLECYSTECTOMY  2013    Dr Wilbur Christianson    HAND SURGERY Right     laceration repair    OTHER SURGICAL HISTORY  06/15/2023    anterior artery stent Dr. Pendleton        Family History   Problem Relation Age of Onset    Stroke Father     Kidney Disease Father     Heart Disease Mother     Cancer Mother         kidney diagnosed at age 67    Social History     Tobacco Use    Smoking status: Never    Smokeless tobacco: Never   Vaping Use    Vaping Use: Never used   Substance Use Topics    Alcohol use: Not Currently     Comment: rarely    Drug use: No        Allergies: Hydrocodone    Review of Systems  Review of Systems - History obtained from the patient  General ROS: negative for - fever, malaise, or weight

## 2024-04-29 NOTE — PATIENT INSTRUCTIONS
Make the following appointments:    Dietician, Nayla Venegas.  Psychologist, Dr. Davin Brush.  Schedule screening upper endoscopy with Dr. Vazquez. No aspirin, ibuprofen or other non-steroidal anti-inflammatory drugs (NSAIDs) 7 days prior to surgery and endoscopy    No food or drink six hours prior to surgery.    If you have [facility] [place] insurance and do not want to wait 6 months for surgery given that there is no scientific data to support such a delay, call [phone] to switch to a [employer] medicaid plan.    THE BIG FOUR GUIDELINES:  1.  Count calories!  People who count calories eat less.  Use the daily plate or other apps for your smart phone or computer.  The more you count the more of an expert you will become and will get easier and easier.  If you are trying to lose weight and exercising a lot, keep calories to around the thousand to 1200 a day.  If you are not exercising consistently try to limit them to around 800 a day.    2.  Separate liquids and solids.  Wait 30 minutes to an hour after you eat before drinking liquids.  This will reduce the total amount of food you eat in the meal.    3.  Exercise!  You need up to 5 hours a week with a combination of cardio and resistance or weight training in order to keep excess body fat off.    4.  Sleep!   Try to get 7 or more hours of sleep a night.  If you have obstructive sleep apnea definitely use your CPAP machine.    THE RULE OF 20'S:  For every meal, chew each bite 20 seconds.  Then put the fork down and wait 20 seconds after you swallow before picking up the fork again.  Each meal should take at least 20 minutes so your brain can register that you are full.

## 2024-04-30 LAB — VITAMIN B-12: 483 PG/ML (ref 232–1245)

## 2024-05-01 LAB
AMPHET CTO UR CFM-MCNC: NEGATIVE NG/ML
BARBITURATES CTO UR CFM-MCNC: NEGATIVE NG/ML
BENZODIAZ CTO UR CFM-MCNC: NEGATIVE NG/ML
CANNABINOIDS CTO UR CFM-MCNC: NEGATIVE NG/ML
COCAINE CTO UR CFM-MCNC: NEGATIVE NG/ML
CREAT UR-MCNC: 102.2 MG/DL (ref 20–400)
Lab: NORMAL
OPIATES CTO UR CFM-MCNC: NEGATIVE NG/ML
PCP CTO UR CFM-MCNC: NEGATIVE NG/ML

## 2024-05-05 LAB — VIT B1 BLD-MCNC: 159 NMOL/L (ref 70–180)

## 2024-05-08 ENCOUNTER — APPOINTMENT (OUTPATIENT)
Dept: CARDIOLOGY | Facility: CLINIC | Age: 59
End: 2024-05-08
Payer: MEDICAID

## 2024-05-08 ENCOUNTER — OFFICE VISIT (OUTPATIENT)
Dept: CARDIOLOGY | Facility: CLINIC | Age: 59
End: 2024-05-08
Payer: MEDICAID

## 2024-05-08 ENCOUNTER — OFFICE VISIT (OUTPATIENT)
Dept: BARIATRICS/WEIGHT MGMT | Age: 59
End: 2024-05-08
Payer: MEDICAID

## 2024-05-08 VITALS
SYSTOLIC BLOOD PRESSURE: 110 MMHG | HEART RATE: 68 BPM | BODY MASS INDEX: 38.74 KG/M2 | HEIGHT: 65 IN | DIASTOLIC BLOOD PRESSURE: 82 MMHG | WEIGHT: 232.5 LBS

## 2024-05-08 VITALS — BODY MASS INDEX: 38.62 KG/M2 | WEIGHT: 231.8 LBS | HEIGHT: 65 IN

## 2024-05-08 DIAGNOSIS — F41.9 ANXIETY: ICD-10-CM

## 2024-05-08 DIAGNOSIS — I10 ESSENTIAL HYPERTENSION, BENIGN: ICD-10-CM

## 2024-05-08 DIAGNOSIS — E78.2 MIXED HYPERLIPIDEMIA: ICD-10-CM

## 2024-05-08 DIAGNOSIS — E66.01 SEVERE OBESITY (BMI 35.0-39.9) WITH COMORBIDITY (HCC): Primary | ICD-10-CM

## 2024-05-08 DIAGNOSIS — Z78.9 NEVER SMOKED CIGARETTES: ICD-10-CM

## 2024-05-08 DIAGNOSIS — Z71.3 DIETARY COUNSELING AND SURVEILLANCE: ICD-10-CM

## 2024-05-08 DIAGNOSIS — G47.33 OBSTRUCTIVE SLEEP APNEA OF ADULT: ICD-10-CM

## 2024-05-08 DIAGNOSIS — Z86.79 H/O UNSTABLE ANGINA: ICD-10-CM

## 2024-05-08 DIAGNOSIS — Z82.49 FAMILY HISTORY OF PREMATURE CAD: ICD-10-CM

## 2024-05-08 DIAGNOSIS — Z98.61 CAD S/P PERCUTANEOUS CORONARY ANGIOPLASTY: ICD-10-CM

## 2024-05-08 DIAGNOSIS — Z01.810 PREOPERATIVE CARDIOVASCULAR EXAMINATION: ICD-10-CM

## 2024-05-08 DIAGNOSIS — I25.10 CAD S/P PERCUTANEOUS CORONARY ANGIOPLASTY: ICD-10-CM

## 2024-05-08 DIAGNOSIS — N40.0 BENIGN PROSTATIC HYPERPLASIA, UNSPECIFIED WHETHER LOWER URINARY TRACT SYMPTOMS PRESENT: ICD-10-CM

## 2024-05-08 PROCEDURE — 1036F TOBACCO NON-USER: CPT | Performed by: INTERNAL MEDICINE

## 2024-05-08 PROCEDURE — 3074F SYST BP LT 130 MM HG: CPT | Performed by: INTERNAL MEDICINE

## 2024-05-08 PROCEDURE — 97802 MEDICAL NUTRITION INDIV IN: CPT | Performed by: DIETITIAN, REGISTERED

## 2024-05-08 PROCEDURE — 3079F DIAST BP 80-89 MM HG: CPT | Performed by: INTERNAL MEDICINE

## 2024-05-08 PROCEDURE — 93000 ELECTROCARDIOGRAM COMPLETE: CPT | Performed by: INTERNAL MEDICINE

## 2024-05-08 PROCEDURE — 99214 OFFICE O/P EST MOD 30 MIN: CPT | Performed by: INTERNAL MEDICINE

## 2024-05-08 PROCEDURE — 3008F BODY MASS INDEX DOCD: CPT | Performed by: INTERNAL MEDICINE

## 2024-05-08 NOTE — PATIENT INSTRUCTIONS
Patient to follow up in 1 year with Dr. Brain Turcios MD      Patient cleared for pending weight loss surgery in near future.   OK to hold Aspirin 7 days prior to procedure.     OK to STOP Clopidogrel (Plavix) AFTER Madeline 15, 2024 entirely    No other changes today.   Continue same medications and treatments.   Patient educated on proper medication use.   Patient educated on risk factor modification.   Please bring any lab results from other providers / physicians to your next appointment.     Please bring all medicines, vitamins, and herbal supplements with you when you come to the office.     Prescriptions will not be filled unless you are compliant with your follow up appointments or have a follow up appointment scheduled as per instruction of your physician. Refills should be requested at the time of your visit.    IDalton RN am scribing for and in the presence of Dr. Brain Turcios MD

## 2024-05-08 NOTE — PROGRESS NOTES
Surgery Behavior Change Goal Sheet that was reviewed today to assist with weight loss and establish a  long term healthy eating pattern.  Individual goals set with patient to be reviewed at monthly office visits.    Plan/Recommendations:   Omit all caloric and carbonated beverages, increase approved fluids to 64 oz/day  Choose healthier options when dining out/fast food  Reduce pizza and wings to once/week  Read \"Patient Guide to Bariatric Surgery\" before next visit    Education materials provided:   Patient Guide to Bariatric Surgery    Follow up visit: 4 weeks with dietitian.     Total time involved in direct patient education: 45 minutes    Nayla Venegas RD

## 2024-05-08 NOTE — PROGRESS NOTES
CARDIOLOGY OFFICE VISIT      CHIEF COMPLAINT      HISTORY OF PRESENT ILLNESS  The patient states she has been feeling well.  He states that he is going to have gastric bypass surgery at [facility] later this year.  He denies chest discomfort or symptoms of myocardial ischemia.  He has not used nitroglycerin.  He has his usual dyspnea secondary to his weight.  He denies any worsening this.  He denies pretibial edema.  He denies palpitations and syncope.  He denies any problem with his current medications.  I told him he could discontinue his Plavix 1 year after his PCI of the RCA in June 2023.  He will keep on aspirin we will hold it for 7 days prior to his upcoming gastric bypass procedure.    EKG: Normal sinus rhythm, possible remote inferior infarction, no change from EKG from 1023, results discussed with the patient    Impression:  Coronary Artery Disease, no angina  Unstable Angina. June 2023  PCI TIFFANY to Distal RCA. June 2023  Hypertension  Hyperlipidemia, Mixed  BPH  Anxiety  Significant Family History CAD  Obesity  Obstructive Sleep Apnea, no CPAP  Normal Ejection Fraction on Echo 6/15/2023    Patient is an acceptable cardiovascular risk for planned weight loss procedure in near future at [facility] assuming preoperative lab work is appropriate. Copy of EKG today given to patient- no changes compared to previous.      Please excuse any errors in grammar or translation related to this dictation. Voice recognition software was utilized to prepare this document.     Past Medical History  Past Medical History:   Diagnosis Date    Hypertension        Social History  Social History     Tobacco Use    Smoking status: Never    Smokeless tobacco: Never   Substance Use Topics    Alcohol use: Never    Drug use: Never       Family History     Family History   Problem Relation Name Age of Onset    Hyperlipidemia Mother      Hypertension Mother      Coronary artery disease Mother      Diabetes Mother      Stroke Father       Dementia Father          Allergies:  Allergies   Allergen Reactions    Hydrocodone Nausea/vomiting        Outpatient Medications:  Current Outpatient Medications   Medication Instructions    amLODIPine (NORVASC) 5 mg, oral, Daily    aspirin 81 mg, oral, Daily    atorvastatin (LIPITOR) 40 mg, oral, Daily    cetirizine (ZYRTEC) 10 mg, oral, Daily    clopidogrel (PLAVIX) 75 mg, oral, Daily    fexofenadine (ALLEGRA) 180 mg, oral, Daily    ibuprofen 800 mg, oral, 3 times daily, PRN    metoprolol succinate XL (TOPROL-XL) 100 mg, oral, Daily    nitroglycerin (Nitrostat) 0.4 mg SL tablet 1 tablet (0.4 mg) every 5 minutes if needed for chest pain.    tamsulosin (FLOMAX) 0.4 mg, oral, Daily          REVIEW OF SYSTEMS  Review of Systems   All other systems reviewed and are negative.        VITALS  Vitals:    05/08/24 0955   BP: 110/82   Pulse: 68       PHYSICAL EXAM  Vitals reviewed.   Constitutional:       Appearance: Normal and healthy appearance. Well-developed and not in distress.   Eyes:      Conjunctiva/sclera: Conjunctivae normal.      Pupils: Pupils are equal, round, and reactive to light.   Neck:      Vascular: No JVR. JVD normal.   Pulmonary:      Effort: Pulmonary effort is normal.      Breath sounds: Normal breath sounds. No wheezing. No rhonchi. No rales.   Chest:      Chest wall: Not tender to palpatation.   Cardiovascular:      PMI at left midclavicular line. Normal rate. Regular rhythm. Normal S1. Normal S2.       Murmurs: There is no murmur.      No gallop.  No click. No rub.   Pulses:     Intact distal pulses.   Edema:     Peripheral edema absent.   Abdominal:      Tenderness: There is no abdominal tenderness.   Musculoskeletal: Normal range of motion.         General: No tenderness.      Cervical back: Normal range of motion. Skin:     General: Skin is warm and dry.   Neurological:      General: No focal deficit present.      Mental Status: Alert and oriented to person, place and time.   Psychiatric:          Behavior: Behavior is cooperative.           ASSESSMENT AND PLAN  Diagnoses and all orders for this visit:  Preoperative cardiovascular examination  CAD S/P percutaneous coronary angioplasty  H/O unstable angina  Essential hypertension, benign  Mixed hyperlipidemia  Benign prostatic hyperplasia, unspecified whether lower urinary tract symptoms present  Anxiety  Family history of premature CAD  Obstructive sleep apnea of adult  Never smoked cigarettes  BMI 38.0-38.9,adult      [unfilled]

## 2024-05-10 ENCOUNTER — OFFICE VISIT (OUTPATIENT)
Dept: FAMILY MEDICINE CLINIC | Age: 59
End: 2024-05-10
Payer: MEDICAID

## 2024-05-10 ENCOUNTER — TELEPHONE (OUTPATIENT)
Dept: FAMILY MEDICINE CLINIC | Age: 59
End: 2024-05-10

## 2024-05-10 VITALS
BODY MASS INDEX: 38.65 KG/M2 | OXYGEN SATURATION: 96 % | SYSTOLIC BLOOD PRESSURE: 108 MMHG | WEIGHT: 232 LBS | DIASTOLIC BLOOD PRESSURE: 88 MMHG | HEIGHT: 65 IN | HEART RATE: 81 BPM

## 2024-05-10 DIAGNOSIS — I25.119 CORONARY ARTERY DISEASE INVOLVING NATIVE CORONARY ARTERY OF NATIVE HEART WITH ANGINA PECTORIS (HCC): ICD-10-CM

## 2024-05-10 DIAGNOSIS — G47.33 OBSTRUCTIVE SLEEP APNEA SYNDROME: ICD-10-CM

## 2024-05-10 DIAGNOSIS — M17.12 LOCALIZED OSTEOARTHRITIS OF LEFT KNEE: ICD-10-CM

## 2024-05-10 DIAGNOSIS — E78.5 HYPERLIPIDEMIA, UNSPECIFIED HYPERLIPIDEMIA TYPE: ICD-10-CM

## 2024-05-10 DIAGNOSIS — M75.41 IMPINGEMENT SYNDROME OF RIGHT SHOULDER: ICD-10-CM

## 2024-05-10 DIAGNOSIS — E66.01 MORBID OBESITY (HCC): Primary | ICD-10-CM

## 2024-05-10 DIAGNOSIS — I10 ESSENTIAL HYPERTENSION: ICD-10-CM

## 2024-05-10 PROCEDURE — 3079F DIAST BP 80-89 MM HG: CPT | Performed by: FAMILY MEDICINE

## 2024-05-10 PROCEDURE — 3074F SYST BP LT 130 MM HG: CPT | Performed by: FAMILY MEDICINE

## 2024-05-10 PROCEDURE — 99213 OFFICE O/P EST LOW 20 MIN: CPT | Performed by: FAMILY MEDICINE

## 2024-05-10 ASSESSMENT — ENCOUNTER SYMPTOMS
CONSTIPATION: 0
COUGH: 0
DIARRHEA: 0
WHEEZING: 0
ABDOMINAL PAIN: 0
SORE THROAT: 0
RHINORRHEA: 0
SHORTNESS OF BREATH: 0

## 2024-05-10 NOTE — PROGRESS NOTES
[facility]  [address] [place] [id number]  Dept: [phone]  Dept Fax: [phone]     Chief Complaint:  Chief Complaint   Patient presents with    Shoulder Pain     Right shoulder x 6 months and worsening. States pain radiates to wrist. Unable to lift anything heavy.    Weight Loss     Needs letter of medical necessity for bariatric surgery. Surgeon is Dr. Vazquez.        Vitals:    05/10/24 0836   BP: 108/88   Site: Left Upper Arm   Position: Sitting   Cuff Size: Medium Adult   Pulse: 81   SpO2: 96%   Weight: 105.2 kg (232 lb)   Height: 1.651 m (5' 5\")       HPI:  58 y.o.male who presents for the following:      Seeing bariatric clinic and needs a letter of medical necessity written to help him get bariatric surgery    R shoulder pain: continues to hurt and bothers the sleep; still able to function but painful with lifting things; taking ibuprofen without relief; much pain with lifting past 90 degrees; pain shoots down arm at times    Recall 11/7/23: R shoulder pain: x1.5mo; started hurting after reaching to pick something; still with pain with reaching sometimes; no swelling or redness; not taking anything; bothers him if sleeping on R side.     -----------------------------------------------------------------------------    Assessment/Plan:  58 y.o. male here mainly for the following:  Obesity  Letter has been written; will send to bariatric clinic; also gave pt a copy  R shoulder  Likely impingement syndrome  Offered injection, PT. He prefers to see ortho     Diagnosis Orders   1. Morbid obesity (HCC)        2. Impingement syndrome of right shoulder  [facility] - Mouna Alba MD Orthopedics - [place]      3. Obstructive sleep apnea syndrome        4. Essential hypertension        5. Hyperlipidemia, unspecified hyperlipidemia type        6. Coronary artery disease involving native coronary artery of native heart with angina pectoris (HCC)        7. Localized

## 2024-05-10 NOTE — TELEPHONE ENCOUNTER
I've written a letter of medical necessity for bariatric surgery. Can we call Dr. Vazquez' office to see where we should send this letter.

## 2024-05-10 NOTE — TELEPHONE ENCOUNTER
Called and spoke with Melany she states that we do not need to send this because it is in his chart.

## 2024-05-15 SDOH — HEALTH STABILITY: PHYSICAL HEALTH: ON AVERAGE, HOW MANY DAYS PER WEEK DO YOU ENGAGE IN MODERATE TO STRENUOUS EXERCISE (LIKE A BRISK WALK)?: 0 DAYS

## 2024-05-16 ENCOUNTER — HOSPITAL ENCOUNTER (OUTPATIENT)
Dept: GENERAL RADIOLOGY | Age: 59
Discharge: HOME | End: 2024-05-18
Payer: MEDICAID

## 2024-05-16 ENCOUNTER — OFFICE VISIT (OUTPATIENT)
Dept: ORTHOPEDIC SURGERY | Age: 59
End: 2024-05-16

## 2024-05-16 ENCOUNTER — HOSPITAL ENCOUNTER (OUTPATIENT)
Age: 59
Discharge: HOME | End: 2024-05-18
Payer: MEDICAID

## 2024-05-16 VITALS
BODY MASS INDEX: 38.65 KG/M2 | OXYGEN SATURATION: 96 % | TEMPERATURE: 97.7 F | WEIGHT: 232 LBS | HEIGHT: 65 IN | HEART RATE: 56 BPM

## 2024-05-16 DIAGNOSIS — M25.511 RIGHT SHOULDER PAIN, UNSPECIFIED CHRONICITY: ICD-10-CM

## 2024-05-16 DIAGNOSIS — M75.101 NONTRAUMATIC TEAR OF RIGHT ROTATOR CUFF, UNSPECIFIED TEAR EXTENT: ICD-10-CM

## 2024-05-16 DIAGNOSIS — M25.511 RIGHT SHOULDER PAIN, UNSPECIFIED CHRONICITY: Primary | ICD-10-CM

## 2024-05-16 PROCEDURE — 73030 X-RAY EXAM OF SHOULDER: CPT

## 2024-05-16 RX ORDER — METHYLPREDNISOLONE ACETATE 80 MG/ML
80 INJECTION, SUSPENSION INTRA-ARTICULAR; INTRALESIONAL; INTRAMUSCULAR; SOFT TISSUE ONCE
Status: COMPLETED | OUTPATIENT
Start: 2024-05-16 | End: 2024-05-16

## 2024-05-16 RX ORDER — LIDOCAINE HYDROCHLORIDE 10 MG/ML
5 INJECTION, SOLUTION INFILTRATION; PERINEURAL ONCE
Status: COMPLETED | OUTPATIENT
Start: 2024-05-16 | End: 2024-05-16

## 2024-05-16 RX ADMIN — METHYLPREDNISOLONE ACETATE 80 MG: 80 INJECTION, SUSPENSION INTRA-ARTICULAR; INTRALESIONAL; INTRAMUSCULAR; SOFT TISSUE at 09:44

## 2024-05-16 RX ADMIN — LIDOCAINE HYDROCHLORIDE 5 ML: 10 INJECTION, SOLUTION INFILTRATION; PERINEURAL at 09:43

## 2024-05-16 ASSESSMENT — ENCOUNTER SYMPTOMS
GASTROINTESTINAL NEGATIVE: 1
EYES NEGATIVE: 1
ALLERGIC/IMMUNOLOGIC NEGATIVE: 1
RESPIRATORY NEGATIVE: 1

## 2024-05-16 NOTE — PROGRESS NOTES
symptoms persist after 6 to 8 weeks of nonoperative treatment then we will obtain an MRI of the right shoulder.  We will have to keep in mind that he is having bariatric surgery with Dr. Vazquez around November of this year.    Orders Placed This Encounter   Procedures    XR SHOULDER RIGHT (MIN 2 VIEWS)     Standing Status:   Future     Number of Occurrences:   1     Standing Expiration Date:   5/16/2025     Scheduling Instructions:      AP, grashey, axillary     Order Specific Question:   Reason for exam:     Answer:   shoulder pain    Ambulatory referral to Physical Therapy     Referral Priority:   Routine     Referral Type:   Eval and Treat     Referral Reason:   Specialty Services Required     Requested Specialty:   Physical Therapist     Number of Visits Requested:   1    [place] ARTHROCENTESIS ASPIR&/INJ MAJOR JT/BURSA W/O US     Orders Placed This Encounter   Medications    lidocaine 1 % injection 5 mL    methylPREDNISolone acetate (DEPO-MEDROL) injection 80 mg     Return if symptoms worsen or fail to improve.    Mouna Alba MD

## 2024-05-21 RX ORDER — SODIUM CHLORIDE 9 MG/ML
INJECTION, SOLUTION INTRAVENOUS PRN
Status: CANCELLED | OUTPATIENT
Start: 2024-05-21

## 2024-05-21 RX ORDER — SODIUM CHLORIDE 0.9 % (FLUSH) 0.9 %
5-40 SYRINGE (ML) INJECTION EVERY 12 HOURS SCHEDULED
Status: CANCELLED | OUTPATIENT
Start: 2024-05-21

## 2024-05-21 RX ORDER — SODIUM CHLORIDE 0.9 % (FLUSH) 0.9 %
5-40 SYRINGE (ML) INJECTION PRN
Status: CANCELLED | OUTPATIENT
Start: 2024-05-21

## 2024-05-21 RX ORDER — SODIUM CHLORIDE 9 MG/ML
INJECTION, SOLUTION INTRAVENOUS CONTINUOUS
Status: CANCELLED | OUTPATIENT
Start: 2024-05-21

## 2024-05-22 ENCOUNTER — OFFICE VISIT (OUTPATIENT)
Dept: PULMONOLOGY | Age: 59
End: 2024-05-22
Payer: MEDICAID

## 2024-05-22 ENCOUNTER — HOSPITAL ENCOUNTER (OUTPATIENT)
Age: 59
Setting detail: OUTPATIENT SURGERY
Discharge: HOME | End: 2024-05-22
Attending: SURGERY | Admitting: SURGERY
Payer: MEDICAID

## 2024-05-22 ENCOUNTER — ANESTHESIA EVENT (OUTPATIENT)
Dept: ENDOSCOPY | Age: 59
End: 2024-05-22
Payer: MEDICAID

## 2024-05-22 ENCOUNTER — ANESTHESIA (OUTPATIENT)
Dept: ENDOSCOPY | Age: 59
End: 2024-05-22
Payer: MEDICAID

## 2024-05-22 VITALS
SYSTOLIC BLOOD PRESSURE: 125 MMHG | OXYGEN SATURATION: 96 % | DIASTOLIC BLOOD PRESSURE: 92 MMHG | HEIGHT: 65 IN | HEART RATE: 86 BPM | BODY MASS INDEX: 38.65 KG/M2 | TEMPERATURE: 97.6 F | WEIGHT: 232 LBS | RESPIRATION RATE: 20 BRPM

## 2024-05-22 VITALS
HEIGHT: 65 IN | BODY MASS INDEX: 38.15 KG/M2 | WEIGHT: 229 LBS | DIASTOLIC BLOOD PRESSURE: 72 MMHG | OXYGEN SATURATION: 95 % | HEART RATE: 76 BPM | TEMPERATURE: 97.6 F | SYSTOLIC BLOOD PRESSURE: 118 MMHG

## 2024-05-22 DIAGNOSIS — E66.01 MORBID OBESITY (HCC): ICD-10-CM

## 2024-05-22 DIAGNOSIS — G47.33 OSA (OBSTRUCTIVE SLEEP APNEA): Primary | ICD-10-CM

## 2024-05-22 DIAGNOSIS — E66.9 OBESITY, UNSPECIFIED CLASSIFICATION, UNSPECIFIED OBESITY TYPE, UNSPECIFIED WHETHER SERIOUS COMORBIDITY PRESENT: ICD-10-CM

## 2024-05-22 PROCEDURE — 43239 EGD BIOPSY SINGLE/MULTIPLE: CPT | Performed by: SURGERY

## 2024-05-22 PROCEDURE — 3700000000 HC ANESTHESIA ATTENDED CARE: Performed by: SURGERY

## 2024-05-22 PROCEDURE — 3078F DIAST BP <80 MM HG: CPT | Performed by: INTERNAL MEDICINE

## 2024-05-22 PROCEDURE — 3609017100 HC EGD: Performed by: SURGERY

## 2024-05-22 PROCEDURE — 3074F SYST BP LT 130 MM HG: CPT | Performed by: INTERNAL MEDICINE

## 2024-05-22 PROCEDURE — 2709999900 HC NON-CHARGEABLE SUPPLY: Performed by: SURGERY

## 2024-05-22 PROCEDURE — 99203 OFFICE O/P NEW LOW 30 MIN: CPT | Performed by: INTERNAL MEDICINE

## 2024-05-22 PROCEDURE — 7100000011 HC PHASE II RECOVERY - ADDTL 15 MIN: Performed by: SURGERY

## 2024-05-22 PROCEDURE — 6360000002 HC RX W HCPCS: Performed by: NURSE ANESTHETIST, CERTIFIED REGISTERED

## 2024-05-22 PROCEDURE — 2580000003 HC RX 258

## 2024-05-22 PROCEDURE — 2580000003 HC RX 258: Performed by: SURGERY

## 2024-05-22 PROCEDURE — 88305 TISSUE EXAM BY PATHOLOGIST: CPT

## 2024-05-22 PROCEDURE — 88342 IMHCHEM/IMCYTCHM 1ST ANTB: CPT

## 2024-05-22 PROCEDURE — 6370000000 HC RX 637 (ALT 250 FOR IP): Performed by: SURGERY

## 2024-05-22 PROCEDURE — 2500000003 HC RX 250 WO HCPCS: Performed by: NURSE ANESTHETIST, CERTIFIED REGISTERED

## 2024-05-22 PROCEDURE — 7100000010 HC PHASE II RECOVERY - FIRST 15 MIN: Performed by: SURGERY

## 2024-05-22 RX ORDER — SIMETHICONE 40MG/0.6ML
SUSPENSION, DROPS(FINAL DOSAGE FORM)(ML) ORAL PRN
Status: DISCONTINUED | OUTPATIENT
Start: 2024-05-22 | End: 2024-05-22 | Stop reason: ALTCHOICE

## 2024-05-22 RX ORDER — LIDOCAINE HYDROCHLORIDE 20 MG/ML
INJECTION, SOLUTION EPIDURAL; INFILTRATION; INTRACAUDAL; PERINEURAL PRN
Status: DISCONTINUED | OUTPATIENT
Start: 2024-05-22 | End: 2024-05-22 | Stop reason: SDUPTHER

## 2024-05-22 RX ORDER — SODIUM CHLORIDE 9 MG/ML
INJECTION, SOLUTION INTRAVENOUS CONTINUOUS
Status: DISCONTINUED | OUTPATIENT
Start: 2024-05-22 | End: 2024-05-22 | Stop reason: HOSPADM

## 2024-05-22 RX ORDER — SODIUM CHLORIDE 0.9 % (FLUSH) 0.9 %
5-40 SYRINGE (ML) INJECTION PRN
Status: DISCONTINUED | OUTPATIENT
Start: 2024-05-22 | End: 2024-05-22 | Stop reason: HOSPADM

## 2024-05-22 RX ORDER — SODIUM CHLORIDE 9 MG/ML
INJECTION, SOLUTION INTRAVENOUS PRN
Status: DISCONTINUED | OUTPATIENT
Start: 2024-05-22 | End: 2024-05-22 | Stop reason: HOSPADM

## 2024-05-22 RX ORDER — SODIUM CHLORIDE 0.9 % (FLUSH) 0.9 %
5-40 SYRINGE (ML) INJECTION EVERY 12 HOURS SCHEDULED
Status: DISCONTINUED | OUTPATIENT
Start: 2024-05-22 | End: 2024-05-22 | Stop reason: HOSPADM

## 2024-05-22 RX ORDER — PROPOFOL 10 MG/ML
INJECTION, EMULSION INTRAVENOUS PRN
Status: DISCONTINUED | OUTPATIENT
Start: 2024-05-22 | End: 2024-05-22 | Stop reason: SDUPTHER

## 2024-05-22 RX ORDER — SODIUM CHLORIDE 9 MG/ML
INJECTION, SOLUTION INTRAVENOUS
Status: COMPLETED
Start: 2024-05-22 | End: 2024-05-22

## 2024-05-22 RX ADMIN — LIDOCAINE HYDROCHLORIDE 50 MG: 20 INJECTION, SOLUTION EPIDURAL; INFILTRATION; INTRACAUDAL; PERINEURAL at 10:45

## 2024-05-22 RX ADMIN — PROPOFOL 100 MG: 10 INJECTION, EMULSION INTRAVENOUS at 10:45

## 2024-05-22 RX ADMIN — PROPOFOL 100 MG: 10 INJECTION, EMULSION INTRAVENOUS at 10:48

## 2024-05-22 RX ADMIN — SODIUM CHLORIDE: 9 INJECTION, SOLUTION INTRAVENOUS at 10:29

## 2024-05-22 ASSESSMENT — PAIN - FUNCTIONAL ASSESSMENT: PAIN_FUNCTIONAL_ASSESSMENT: 0-10

## 2024-05-22 ASSESSMENT — PAIN SCALES - GENERAL: PAINLEVEL_OUTOF10: 0

## 2024-05-22 NOTE — INTERVAL H&P NOTE
Update History & Physical    The patient's History and Physical of April 29, 2024 was reviewed with the patient and I examined the patient. There was no change. The surgical site was confirmed by the patient and me.     Plan: The risks, benefits, expected outcome, and alternative to the recommended procedure have been discussed with the patient. Patient understands and wants to proceed with the procedure.     Electronically signed by Mainor Vazquez MD on 5/22/2024 at 10:56 AM

## 2024-05-22 NOTE — ANESTHESIA POSTPROCEDURE EVALUATION
Department of Anesthesiology  Postprocedure Note    Patient: Augusto Jeffery  MRN: [id number]  YOB: 1965  Date of evaluation: 5/22/2024    Procedure Summary       Date: 05/22/24 Room / Location: [facility] OR 03 / [facility]    Anesthesia Start: 1042 Anesthesia Stop: 1054    Procedure: EGD ESOPHAGOGASTRODUODENOSCOPY Diagnosis:       Morbid obesity (HCC)      (Morbid obesity (HCC) [E66.01])    Surgeons: Mainor Vazquez MD Responsible Provider: Wil Lozano APRN - CRNA    Anesthesia Type: MAC ASA Status: 3            Anesthesia Type: No value filed.    Daniela Phase I: Daniela Score: 10    Daniela Phase II:      Anesthesia Post Evaluation    Patient location during evaluation: bedside  Patient participation: complete - patient participated  Level of consciousness: awake  Airway patency: patent  Nausea & Vomiting: no nausea  Cardiovascular status: blood pressure returned to baseline  Respiratory status: acceptable  Hydration status: euvolemic  Pain management: adequate        No notable events documented.

## 2024-05-22 NOTE — PROGRESS NOTES
by mouth daily 180 capsule 3    clopidogrel (PLAVIX) 75 MG tablet Take 1 tablet by mouth daily 90 tablet 3    metoprolol succinate (TOPROL XL) 50 MG extended release tablet Take 1 tablet by mouth daily 90 tablet 3     No current facility-administered medications for this visit.       Social History  Social History     Tobacco Use    Smoking status: Never    Smokeless tobacco: Never   Substance Use Topics    Alcohol use: Not Currently     Comment: rarely       Family History  Family History   Problem Relation Age of Onset    Stroke Father     Kidney Disease Father     Heart Disease Mother     Cancer Mother         kidney diagnosed at age 67       Review of Systems  All review of systems has been obtained and negative other than what was mentionedin HPI.     Physical Exam         Vitals:    05/22/24 1430   BP: 118/72   Pulse: 76   Temp: 97.6 °F (36.4 °C)   TempSrc: Tympanic   SpO2: 95%   Weight: 103.9 kg (229 lb)   Height: 1.651 m (5' 5\")          General appearance: Well appearing. No acute distress. AAOX3  Head: Normocephalic, without obvious abnormality, atraumatic   Eyes:Pupils bilateral equal and reactive, EOM intact. Normal sclera and conjunctiva   Nose: Mucosa pink  Throat: Clear,  [place] 3  Neck: Supple, No JVD. Nothyromegaly. Neck is thick  Lungs: Clear bilaterally. No wheezing. No crackles. No use of accessory muscles.   Heart: RRR, S1, S2 normal, no murmur, click, rub or gallop   Abdomen: soft, non-tender, nondistended. Bowel sounds normal. No hernia. No organomegaly.   Extremities: extremities normal, atraumatic, no cyanosis, no edema  Skin: Skin color, texture, turgor normal. No rashes or lesions   Neurological: No focal deficits,cranial nerves grossly intact. No weakness. Sensation normal   Psych: Normal Mood  Musculoskeletal: No joint abnormalities.                  Impression:     Diagnosis Orders   1. DENEEN (obstructive sleep apnea)        2. Obesity, unspecified classification, unspecified obesity

## 2024-05-22 NOTE — PROGRESS NOTES
Colonoscopy exam cancelled today per MD and patient will need to reschedule colonoscopy when able.

## 2024-05-22 NOTE — ANESTHESIA PRE PROCEDURE
Department of Anesthesiology  Preprocedure Note       Name:  Augusto Jeffery   Age:  58 y.o.  :  1965                                          MRN:  [id number]         Date:  2024      Surgeon: Surgeon(s):  Mainor Vazquez MD    Procedure: Procedure(s):  EGD ESOPHAGOGASTRODUODENOSCOPY  COLONOSCOPY BIOPSY    Medications prior to admission:   Prior to Admission medications    Medication Sig Start Date End Date Taking? Authorizing Provider   fexofenadine (ALLEGRA) 180 MG tablet Take 1 tablet by mouth daily 24   Tomas Bucio MD   nitroGLYCERIN (NITROSTAT) 0.4 MG SL tablet PLACE 1 TABLET UNDER THE TONGUE EVERY 5 MINUTES FOR UP TO 3 DOSES AS NEEDED FOR CHEST PAIN. CALL 911 IF PAIN PERSISTS.  Patient not taking: Reported on 2024 10/19/23   ProviderCynthia MD   aspirin 81 MG EC tablet Take 1 tablet by mouth daily 23   Tomas Bucio MD   atorvastatin (LIPITOR) 40 MG tablet Take 1 tablet by mouth daily 23   Tomas Bucio MD   tamsulosin (FLOMAX) 0.4 MG capsule Take 2 capsules by mouth daily 23   Tomas Bucio MD   clopidogrel (PLAVIX) 75 MG tablet Take 1 tablet by mouth daily 23   Tomas Bucio MD   metoprolol succinate (TOPROL XL) 50 MG extended release tablet Take 1 tablet by mouth daily 10/5/16   Leonardo Carcamo MD       Current medications:    Current Facility-Administered Medications   Medication Dose Route Frequency Provider Last Rate Last Admin    0.9 % sodium chloride infusion   IntraVENous Continuous Mainor Vazquez MD        sodium chloride flush 0.9 % injection 5-40 mL  5-40 mL IntraVENous 2 times per day Mainor Vazquez MD        sodium chloride flush 0.9 % injection 5-40 mL  5-40 mL IntraVENous PRN Mainor Vazquez MD        0.9 % sodium chloride infusion   IntraVENous PRN Mainor Vazquez MD        sodium chloride 0.9 % infusion                Allergies:    Allergies   Allergen Reactions    Hydrocodone Nausea

## 2024-05-23 ENCOUNTER — APPOINTMENT (OUTPATIENT)
Dept: CARDIOLOGY | Facility: CLINIC | Age: 59
End: 2024-05-23
Payer: MEDICAID

## 2024-05-29 ENCOUNTER — HOSPITAL ENCOUNTER (OUTPATIENT)
Dept: PHYSICAL THERAPY | Age: 59
Setting detail: THERAPIES SERIES
Discharge: HOME | End: 2024-05-29
Payer: MEDICAID

## 2024-05-29 PROCEDURE — 97162 PT EVAL MOD COMPLEX 30 MIN: CPT

## 2024-05-29 PROCEDURE — 97110 THERAPEUTIC EXERCISES: CPT

## 2024-05-29 NOTE — PROGRESS NOTES
Long Term Goals Completed by 4-6 weeks from date of evaluation Goal Status   Pt reports having 1-2/10 or less R UE pain with reaching overhead and reaching into his back right pocket     Increase AROM of right shoulder flexion= 0-130 and R shoulder abduction 0-130 and able to reach his lower thoracic spine with minimal reports of pain     Increase R UE strength to 4+/5 or > so that pt can ride his motorcycle with less reports of discomfort and can reach overhead with less reports of pain     Improve his SPADI disability score from 72% on evaluation to <25% by time of his Dc     Pt indep with an advanced HEP so that pt can progress with is stength and mobility                                        TREATMENT PLAN            Pt. actively involved in establishing Plan of Care and Goals: Yes  Patient/ Caregiver education and instruction:      KT tape        Treatment may include any combination of the following: Current Treatment Recommendations: Strengthening, ROM, Endurance training, Manual, Pain management, Home exercise program, Modalities  Modalities: Heat/Cold, Ultrasound, E-stim - unattended     Frequency / Duration:  Patient to be seen 1-2 for 4-6 weeks      Eval Complexity:    Decision Making: Medium Complexity    PT Treatment Completed:  Exercises:      Treatment Reasoning    Exercise 1: *Table slidees flexion x 5 H10  Exercise 2: *Table slides abduction x 5 H10  Exercise 3: *Scap retraction x 10                          Therapy Time  Individual Time In:    3:15pm      Individual Time Out:  4:15pm   Minutes:  60 min         Therapist Signature: Teressa Hope, PT    Date: 5/29/2024     I certify that the above Therapy Services are being furnished while the patient is under my care. I agree with the treatment plan and certify that this therapy is necessary.      Physician's Signature:  ___________________________   Date:_______

## 2024-06-04 ENCOUNTER — HOSPITAL ENCOUNTER (OUTPATIENT)
Dept: PHYSICAL THERAPY | Age: 59
Setting detail: THERAPIES SERIES
Discharge: HOME | End: 2024-06-04

## 2024-06-04 NOTE — PROGRESS NOTES
Physical Therapy: Daily Note   Patient: Augusto Jeffery (58 y.o. male)   Examination Date: 2024  Plan of Care/Certification Expiration Date: 24    No data recorded   :  1965 # of Visits since SOC:   2   MRN: [id number]  CSN: [id number] Start of Care Date:   2024   Insurance: Payor: [place] / Plan: [place] / Product Type: *No Product type* /   Insurance ID: [id number] - (Medicaid Managed) Secondary Insurance (if applicable):    Referring Physician: Mouna Alba MD [place]   PCP: Tomas Bucio MD Visits to Date/Visits Approved:     No Show/Cancelled Appts: 0      Medical Diagnosis: No admission diagnoses are documented for this encounter.    Treatment Diagnosis: Nontraumatic tear of right shoulder and right shoulder pain        SUBJECTIVE EXAMINATION     Patient Comments: Subjective: Pt called and left voicemail cancelling today's appt. no reason given.  Therapy Time  Individual Time In:         Individual Time Out:    Minutes:  0 cx        Electronically signed by Tamia Tony PT  on 2024 at 9:41 AM   POC NOTE

## 2024-06-11 DIAGNOSIS — I10 ESSENTIAL HYPERTENSION: ICD-10-CM

## 2024-06-12 DIAGNOSIS — I10 ESSENTIAL HYPERTENSION: ICD-10-CM

## 2024-06-12 RX ORDER — METOPROLOL SUCCINATE 50 MG/1
50 TABLET, EXTENDED RELEASE ORAL DAILY
Qty: 90 TABLET | Refills: 3 | Status: SHIPPED | OUTPATIENT
Start: 2024-06-12 | End: 2024-06-12 | Stop reason: SDUPTHER

## 2024-06-12 RX ORDER — METOPROLOL SUCCINATE 50 MG/1
50 TABLET, EXTENDED RELEASE ORAL DAILY
Qty: 90 TABLET | Refills: 3 | Status: SHIPPED | OUTPATIENT
Start: 2024-06-12

## 2024-06-12 NOTE — TELEPHONE ENCOUNTER
Comments: Patient did not want the printed script, he wanted this to go directly to the pharmacy.    Last Office Visit (last PCP visit):   5/10/2024    Next Visit Date:  Future Appointments   Date Time Provider Department Center   6/19/2024  9:30 AM Nayla Venegas RD [facility]   7/22/2024  1:30 PM Tena Gonzales MD [facility]   11/7/2024  1:30 PM Tomas Bucio MD [facility]       **If hasn't been seen in over a year OR hasn't followed up according to last diabetes/ADHD visit, make appointment for patient before sending refill to provider.    Rx requested:  Requested Prescriptions     Pending Prescriptions Disp Refills    metoprolol succinate (TOPROL XL) 50 MG extended release tablet 90 tablet 3     Sig: Take 1 tablet by mouth daily

## 2024-06-13 ENCOUNTER — HOSPITAL ENCOUNTER (OUTPATIENT)
Dept: SLEEP CENTER | Age: 59
Discharge: HOME | End: 2024-06-15
Attending: INTERNAL MEDICINE
Payer: MEDICAID

## 2024-06-13 DIAGNOSIS — G47.33 OSA (OBSTRUCTIVE SLEEP APNEA): ICD-10-CM

## 2024-06-13 PROCEDURE — 95806 SLEEP STUDY UNATT&RESP EFFT: CPT

## 2024-06-19 ENCOUNTER — OFFICE VISIT (OUTPATIENT)
Dept: BARIATRICS/WEIGHT MGMT | Age: 59
End: 2024-06-19
Payer: MEDICAID

## 2024-06-19 VITALS — HEIGHT: 65 IN | BODY MASS INDEX: 39.22 KG/M2 | WEIGHT: 235.4 LBS

## 2024-06-19 DIAGNOSIS — Z71.3 DIETARY COUNSELING AND SURVEILLANCE: ICD-10-CM

## 2024-06-19 DIAGNOSIS — E66.01 SEVERE OBESITY (BMI 35.0-39.9) WITH COMORBIDITY (HCC): Primary | ICD-10-CM

## 2024-06-19 PROCEDURE — 97803 MED NUTRITION INDIV SUBSEQ: CPT | Performed by: DIETITIAN, REGISTERED
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
## 2024-07-22 ENCOUNTER — OFFICE VISIT (OUTPATIENT)
Dept: PULMONOLOGY | Age: 59
End: 2024-07-22
Payer: MEDICAID

## 2024-07-22 VITALS
BODY MASS INDEX: 39.17 KG/M2 | HEART RATE: 76 BPM | TEMPERATURE: 97.6 F | DIASTOLIC BLOOD PRESSURE: 72 MMHG | SYSTOLIC BLOOD PRESSURE: 124 MMHG | OXYGEN SATURATION: 95 % | HEIGHT: 65 IN

## 2024-07-22 DIAGNOSIS — E66.9 OBESITY, UNSPECIFIED CLASSIFICATION, UNSPECIFIED OBESITY TYPE, UNSPECIFIED WHETHER SERIOUS COMORBIDITY PRESENT: ICD-10-CM

## 2024-07-22 DIAGNOSIS — G47.33 OSA (OBSTRUCTIVE SLEEP APNEA): Primary | ICD-10-CM

## 2024-07-22 DIAGNOSIS — Z78.9 INTOLERANCE OF CONTINUOUS POSITIVE AIRWAY PRESSURE (CPAP) VENTILATION: ICD-10-CM

## 2024-07-22 PROCEDURE — 3078F DIAST BP <80 MM HG: CPT | Performed by: INTERNAL MEDICINE

## 2024-07-22 PROCEDURE — 99213 OFFICE O/P EST LOW 20 MIN: CPT | Performed by: INTERNAL MEDICINE

## 2024-07-22 PROCEDURE — 3074F SYST BP LT 130 MM HG: CPT | Performed by: INTERNAL MEDICINE

## 2024-07-22 NOTE — PROGRESS NOTES
PATIENT VISIT-PULMONARY/SLEEP    7/22/2024     REFERRING PHYSICIAN:  Tomas Bucio MD     REASON FOR REFERRAL: Bariatric clearance    HPI:     Augusto Jeffery is a 59 y.o. male who was referred to sleep clinic for evaluation.     He is in the process of going through weight loss surgery.  He is known to have a history of sleep apnea which was diagnosed at least 5 years ago.  Has been on CPAP but he could not tolerate it.  He gained some weight since then.  He denies any respiratory issues.  No cough, shortness of breath or wheezing.  He is a lifelong non-smoker.           7/22/24:    Returns for follow-up.  Underwent sleep study that showed severe obstructive sleep apnea.  Overall AHI is 31.  There is mild sleep-related hypoxia.  He started to be driving and he does have easily.      Past Medical History   Past Medical History:   Diagnosis Date    ADHD (attention deficit hyperactivity disorder)     Depression     GERD (gastroesophageal reflux disease)     History of blood transfusion     Hyperlipidemia     Hypertension     S/P left heart catheterization by percutaneous approach 2000    Sleep apnea     reports noncompliance with machine        Past Surgical History  Past Surgical History:   Procedure Laterality Date    ANKLE SURGERY Right     fixation    ARM SURGERY Right     laceration repair, tendon repair     CHOLECYSTECTOMY  01/01/2013    Dr Wilbur Christianson    HAND SURGERY Right     laceration repair    OTHER SURGICAL HISTORY  06/15/2023    anterior artery stent Dr. Pendleton    UPPER GASTROINTESTINAL ENDOSCOPY N/A 5/22/2024    EGD ESOPHAGOGASTRODUODENOSCOPY with biopsy performed by Mainor Vazquez MD at [facility]       Allergies  Allergies   Allergen Reactions    Hydrocodone Nausea Only       Medications  Current Outpatient Medications   Medication Sig Dispense Refill    metoprolol succinate (TOPROL XL) 50 MG extended release tablet Take 1 tablet by mouth daily 90 tablet 3

## 2024-07-25 ENCOUNTER — OFFICE VISIT (OUTPATIENT)
Dept: BARIATRICS/WEIGHT MGMT | Age: 59
End: 2024-07-25
Payer: MEDICAID

## 2024-07-25 VITALS — BODY MASS INDEX: 38.57 KG/M2 | WEIGHT: 231.48 LBS | HEIGHT: 65 IN

## 2024-07-25 VITALS — HEIGHT: 65 IN | BODY MASS INDEX: 38.55 KG/M2 | WEIGHT: 231.4 LBS

## 2024-07-25 DIAGNOSIS — E66.01 PSYCHOLOGICAL FACTORS AFFECTING MORBID OBESITY (HCC): ICD-10-CM

## 2024-07-25 DIAGNOSIS — E78.5 HYPERLIPIDEMIA, UNSPECIFIED HYPERLIPIDEMIA TYPE: ICD-10-CM

## 2024-07-25 DIAGNOSIS — G47.33 OSA (OBSTRUCTIVE SLEEP APNEA): ICD-10-CM

## 2024-07-25 DIAGNOSIS — Z71.89 ENCOUNTER FOR PSYCHOLOGICAL ASSESSMENT PRIOR TO BARIATRIC SURGERY: Primary | ICD-10-CM

## 2024-07-25 DIAGNOSIS — M17.12 LOCALIZED OSTEOARTHRITIS OF LEFT KNEE: ICD-10-CM

## 2024-07-25 DIAGNOSIS — F54 PSYCHOLOGICAL FACTORS AFFECTING MORBID OBESITY (HCC): ICD-10-CM

## 2024-07-25 DIAGNOSIS — I25.119 CORONARY ARTERY DISEASE INVOLVING NATIVE CORONARY ARTERY OF NATIVE HEART WITH ANGINA PECTORIS (HCC): ICD-10-CM

## 2024-07-25 DIAGNOSIS — I10 ESSENTIAL HYPERTENSION: ICD-10-CM

## 2024-07-25 DIAGNOSIS — Z01.818 PRE-OP EVALUATION: ICD-10-CM

## 2024-07-25 DIAGNOSIS — E66.01 SEVERE OBESITY (BMI 35.0-39.9) WITH COMORBIDITY (HCC): ICD-10-CM

## 2024-07-25 DIAGNOSIS — E66.01 SEVERE OBESITY (BMI 35.0-39.9) WITH COMORBIDITY (HCC): Primary | ICD-10-CM

## 2024-07-25 DIAGNOSIS — F43.10 PTSD (POST-TRAUMATIC STRESS DISORDER): ICD-10-CM

## 2024-07-25 DIAGNOSIS — Z71.3 DIETARY COUNSELING AND SURVEILLANCE: ICD-10-CM

## 2024-07-25 DIAGNOSIS — Z72.4 INAPPROPRIATE DIET AND EATING HABITS: ICD-10-CM

## 2024-07-25 PROCEDURE — 90791 PSYCH DIAGNOSTIC EVALUATION: CPT | Performed by: PSYCHOLOGIST

## 2024-07-25 PROCEDURE — 97803 MED NUTRITION INDIV SUBSEQ: CPT | Performed by: DIETITIAN, REGISTERED

## 2024-07-25 ASSESSMENT — PATIENT HEALTH QUESTIONNAIRE - PHQ9
1. LITTLE INTEREST OR PLEASURE IN DOING THINGS: NOT AT ALL
SUM OF ALL RESPONSES TO PHQ QUESTIONS 1-9: 1
9. THOUGHTS THAT YOU WOULD BE BETTER OFF DEAD, OR OF HURTING YOURSELF: NOT AT ALL
4. FEELING TIRED OR HAVING LITTLE ENERGY: NOT AT ALL
SUM OF ALL RESPONSES TO PHQ QUESTIONS 1-9: 1
8. MOVING OR SPEAKING SO SLOWLY THAT OTHER PEOPLE COULD HAVE NOTICED. OR THE OPPOSITE, BEING SO FIGETY OR RESTLESS THAT YOU HAVE BEEN MOVING AROUND A LOT MORE THAN USUAL: NOT AT ALL
SUM OF ALL RESPONSES TO PHQ QUESTIONS 1-9: 1
5. POOR APPETITE OR OVEREATING: NOT AT ALL
2. FEELING DOWN, DEPRESSED OR HOPELESS: SEVERAL DAYS
SUM OF ALL RESPONSES TO PHQ9 QUESTIONS 1 & 2: 1
7. TROUBLE CONCENTRATING ON THINGS, SUCH AS READING THE NEWSPAPER OR WATCHING TELEVISION: NOT AT ALL
10. IF YOU CHECKED OFF ANY PROBLEMS, HOW DIFFICULT HAVE THESE PROBLEMS MADE IT FOR YOU TO DO YOUR WORK, TAKE CARE OF THINGS AT HOME, OR GET ALONG WITH OTHER PEOPLE: NOT DIFFICULT AT ALL
6. FEELING BAD ABOUT YOURSELF - OR THAT YOU ARE A FAILURE OR HAVE LET YOURSELF OR YOUR FAMILY DOWN: NOT AT ALL
3. TROUBLE FALLING OR STAYING ASLEEP: NOT AT ALL
SUM OF ALL RESPONSES TO PHQ QUESTIONS 1-9: 1

## 2024-07-25 ASSESSMENT — ANXIETY QUESTIONNAIRES
GAD7 TOTAL SCORE: 7
7. FEELING AFRAID AS IF SOMETHING AWFUL MIGHT HAPPEN: NOT AT ALL
1. FEELING NERVOUS, ANXIOUS, OR ON EDGE: SEVERAL DAYS
4. TROUBLE RELAXING: MORE THAN HALF THE DAYS
IF YOU CHECKED OFF ANY PROBLEMS ON THIS QUESTIONNAIRE, HOW DIFFICULT HAVE THESE PROBLEMS MADE IT FOR YOU TO DO YOUR WORK, TAKE CARE OF THINGS AT HOME, OR GET ALONG WITH OTHER PEOPLE: NOT DIFFICULT AT ALL
5. BEING SO RESTLESS THAT IT IS HARD TO SIT STILL: SEVERAL DAYS
3. WORRYING TOO MUCH ABOUT DIFFERENT THINGS: SEVERAL DAYS
2. NOT BEING ABLE TO STOP OR CONTROL WORRYING: SEVERAL DAYS
6. BECOMING EASILY ANNOYED OR IRRITABLE: SEVERAL DAYS

## 2024-07-25 NOTE — PROGRESS NOTES
required lifestyle changes and dietary restrictions involved to maintain health and weight loss following his surgery.    Diagnosis:    1. Encounter for psychological assessment prior to bariatric surgery    2. Pre-op evaluation    3. Psychological and behavioral factors affecting morbid obesity (HCC)    4. Severe obesity (BMI 35.0-39.9) with comorbidity (HCC)    5. Inappropriate diet and eating habits    6. PTSD (post-traumatic stress disorder)    7. Hyperlipidemia, unspecified hyperlipidemia type    8. DENEEN (obstructive sleep apnea)    9. Localized osteoarthritis of left knee    10. Essential hypertension    11. Coronary artery disease involving native coronary artery of native heart with angina pectoris (HCC)          Requirement for clearance: Augusto must continue to prepare for post-operative lifestyle changes by exhibiting a reduction in consumption of unhealthy snacks and fast food; must evidence the elimination of nighttime snacking.    From a psychological perspective, Augusto presents as a fair candidate for bariatric surgery, but is not yet cleared from a Behavioral Health perspective at this time. Reassessment is recommended after the above concerns have been addressed.       Follow up with Bariatric Psychologist in 2 months.          Electronically signed by Davin Doshi PSYD, [facility] on 8/13/24 at 4:25 PM

## 2024-07-25 NOTE — PROGRESS NOTES
Nutrition follow up prior to surgery  Visit number:  3 out of 6 for Jaime en Y gastric bypass.    Initial Weight: 229 lbs    Wt Readings from Last 3 Encounters:   07/25/24 105 kg (231 lb 6.4 oz)   06/19/24 106.8 kg (235 lb 6.4 oz)   05/22/24 103.9 kg (229 lb)     lost 4 lbs over 1 month, up 2 lbs from initial.    Previous nutrition goals:   Add protein source to breakfast  Choose leaner lunch meats  Begin taking Bariatric MVI with iron and Calcium citrate 500-600mg twice daily  4.   Start walking 3 days/week    Nutrition goals: partially met, patient having a shake for breakfast instead of grits however shake is a meal replacement shake not protein shake. Patient has reduced soda to 1 can/week and drinking 3-4 cups of half caffeine coffee.  Patient has reduced some snacking but continues to include chips, ice cream and candy.  No consistent exercise but states more active around the house/yard work.    PES Statement:  Overweight/Obesity related to a complex combination of decreased energy needs, disordered eating patterns, physical inactivity, and increased psychological/life stress as evidenced by BMI greater than 30 and inability to maintain a significant amount of weight loss through conventional weight loss interventions.    Intervention:  Reviewed previous goals and set new goals.  Stressed importance of preoperative weight loss or surgery may be postponed or cancelled.  Provided continued education regarding diet and lifestyle changes for optimal success post bariatric surgery.  Encouragement and support provided.    Education materials provided:   none    Plan/Recommendations:   Switch to shake with at least 20 grams protein and < 6 grams sugar ( Equate high performance nutrition shake)  Add calcium citrate 2 tablets twice daily  Replace snacks with healthy options discussed such as string cheese and fruit, drinkable greek yogurt, low fat cottage cheese and fruit    Follow up: 4 weeks     Total time involved

## 2024-08-12 ENCOUNTER — TELEPHONE (OUTPATIENT)
Dept: PULMONOLOGY | Age: 59
End: 2024-08-12

## 2024-08-12 NOTE — TELEPHONE ENCOUNTER
PATIENT STATES THAT THE PRESSURES ON HIS CPAP ARE TOO HIGH. THEY ARE AT 17 AND HE FEELS LIKE HE CAN'T BREATH

## 2024-08-20 ENCOUNTER — OFFICE VISIT (OUTPATIENT)
Dept: BARIATRICS/WEIGHT MGMT | Age: 59
End: 2024-08-20
Payer: MEDICAID

## 2024-08-20 VITALS — HEIGHT: 65 IN | WEIGHT: 233 LBS | BODY MASS INDEX: 38.82 KG/M2

## 2024-08-20 DIAGNOSIS — Z71.3 DIETARY COUNSELING AND SURVEILLANCE: ICD-10-CM

## 2024-08-20 DIAGNOSIS — E66.01 SEVERE OBESITY (BMI 35.0-39.9) WITH COMORBIDITY (HCC): Primary | ICD-10-CM

## 2024-08-20 PROCEDURE — 97803 MED NUTRITION INDIV SUBSEQ: CPT | Performed by: DIETITIAN, REGISTERED

## 2024-08-20 NOTE — PROGRESS NOTES
Nutrition follow up prior to surgery  Visit number:  4 out of 6 for Jaime en Y gastric bypass.    Initial Weight: 229 lbs    Wt Readings from Last 3 Encounters:   07/25/24 105 kg (231 lb 7.7 oz)   07/25/24 105 kg (231 lb 6.4 oz)   06/19/24 106.8 kg (235 lb 6.4 oz)     gained 2 lbs over 1 month, up 4 lbs from initial.    Previous nutrition goals:   Switch to shake with at least 20 grams protein and < 6 grams sugar ( Equate high performance nutrition shake)  Add calcium citrate 2 tablets twice daily  Replace snacks with healthy options discussed such as string cheese and fruit, drinkable greek yogurt, low fat cottage cheese and fruit    Nutrition goals: partially met, [facility] has reduced Little Debbies snacks from twice daily to just once. Consuming 3 meals/day, including chips and Little Julita with lunch. [facility] has omitted red meat, candy and eliminated night time snacking.  Patient has reduced half caffeine coffee to 3 cups/day and continues to wean down intake. Started wearing CPAP, reports 86% compliance.    PES Statement:  Overweight/Obesity related to a complex combination of decreased energy needs, disordered eating patterns, physical inactivity, and increased psychological/life stress as evidenced by BMI greater than 30 and inability to maintain a significant amount of weight loss through conventional weight loss interventions.    Intervention:  Reviewed previous goals and set new goals.  Stressed importance of preoperative weight loss or surgery may be postponed or cancelled.  Provided continued education regarding diet and lifestyle changes for optimal success post bariatric surgery.  Encouragement and support provided.    Education materials provided:   none    Plan/Recommendations:   Replace chips and little Julita with fruit  Add high protein snack between meals    Follow up: 4 weeks     Total time involved in direct patient education: 30 minutes    Nayla Venegas RD

## 2024-09-18 ENCOUNTER — OFFICE VISIT (OUTPATIENT)
Dept: PULMONOLOGY | Age: 59
End: 2024-09-18
Payer: MEDICAID

## 2024-09-18 DIAGNOSIS — E66.9 OBESITY, UNSPECIFIED CLASSIFICATION, UNSPECIFIED OBESITY TYPE, UNSPECIFIED WHETHER SERIOUS COMORBIDITY PRESENT: ICD-10-CM

## 2024-09-18 DIAGNOSIS — G47.33 OSA (OBSTRUCTIVE SLEEP APNEA): Primary | ICD-10-CM

## 2024-09-18 PROCEDURE — 99213 OFFICE O/P EST LOW 20 MIN: CPT | Performed by: INTERNAL MEDICINE

## 2024-09-20 ENCOUNTER — OFFICE VISIT (OUTPATIENT)
Dept: BARIATRICS/WEIGHT MGMT | Age: 59
End: 2024-09-20
Payer: MEDICAID

## 2024-09-20 VITALS — BODY MASS INDEX: 38.05 KG/M2 | WEIGHT: 228.4 LBS | HEIGHT: 65 IN

## 2024-09-20 DIAGNOSIS — E66.01 SEVERE OBESITY (BMI 35.0-39.9) WITH COMORBIDITY (HCC): Primary | ICD-10-CM

## 2024-09-20 DIAGNOSIS — Z71.3 DIETARY COUNSELING AND SURVEILLANCE: ICD-10-CM

## 2024-09-20 PROCEDURE — 97803 MED NUTRITION INDIV SUBSEQ: CPT | Performed by: DIETITIAN, REGISTERED

## 2024-10-16 DIAGNOSIS — E66.01 MORBID OBESITY: ICD-10-CM

## 2024-10-16 DIAGNOSIS — I25.119 CORONARY ARTERY DISEASE INVOLVING NATIVE CORONARY ARTERY OF NATIVE HEART WITH ANGINA PECTORIS (HCC): ICD-10-CM

## 2024-10-16 DIAGNOSIS — Z01.818 PREOPERATIVE TESTING: Primary | ICD-10-CM

## 2024-10-17 ENCOUNTER — OFFICE VISIT (OUTPATIENT)
Dept: BARIATRICS/WEIGHT MGMT | Age: 59
End: 2024-10-17

## 2024-10-17 ENCOUNTER — OFFICE VISIT (OUTPATIENT)
Dept: BARIATRICS/WEIGHT MGMT | Age: 59
End: 2024-10-17
Payer: MEDICAID

## 2024-10-17 VITALS — WEIGHT: 226.2 LBS | HEIGHT: 65 IN | BODY MASS INDEX: 37.69 KG/M2

## 2024-10-17 DIAGNOSIS — Z71.3 DIETARY COUNSELING AND SURVEILLANCE: ICD-10-CM

## 2024-10-17 DIAGNOSIS — E66.01 MORBID OBESITY: ICD-10-CM

## 2024-10-17 DIAGNOSIS — E66.01 SEVERE OBESITY (BMI 35.0-39.9) WITH COMORBIDITY: Primary | ICD-10-CM

## 2024-10-17 DIAGNOSIS — F54 PSYCHOLOGICAL FACTORS AFFECTING MORBID OBESITY: Primary | ICD-10-CM

## 2024-10-17 DIAGNOSIS — I25.119 CORONARY ARTERY DISEASE INVOLVING NATIVE CORONARY ARTERY OF NATIVE HEART WITH ANGINA PECTORIS (HCC): ICD-10-CM

## 2024-10-17 DIAGNOSIS — I10 ESSENTIAL HYPERTENSION: ICD-10-CM

## 2024-10-17 DIAGNOSIS — E66.01 PSYCHOLOGICAL FACTORS AFFECTING MORBID OBESITY: Primary | ICD-10-CM

## 2024-10-17 DIAGNOSIS — Z72.4 INAPPROPRIATE DIET AND EATING HABITS: ICD-10-CM

## 2024-10-17 DIAGNOSIS — G47.33 OSA (OBSTRUCTIVE SLEEP APNEA): ICD-10-CM

## 2024-10-17 DIAGNOSIS — E66.01 SEVERE OBESITY (BMI 35.0-39.9) WITH COMORBIDITY: ICD-10-CM

## 2024-10-17 DIAGNOSIS — F43.10 PTSD (POST-TRAUMATIC STRESS DISORDER): ICD-10-CM

## 2024-10-17 DIAGNOSIS — M17.12 LOCALIZED OSTEOARTHRITIS OF LEFT KNEE: ICD-10-CM

## 2024-10-17 DIAGNOSIS — E78.5 HYPERLIPIDEMIA, UNSPECIFIED HYPERLIPIDEMIA TYPE: ICD-10-CM

## 2024-10-17 DIAGNOSIS — Z01.818 PREOPERATIVE TESTING: ICD-10-CM

## 2024-10-17 LAB
ESTIMATED AVERAGE GLUCOSE: 114 MG/DL
HBA1C MFR BLD: 5.6 % (ref 4–6)
TSH REFLEX: 1.88 UIU/ML (ref 0.44–3.86)

## 2024-10-17 PROCEDURE — 97803 MED NUTRITION INDIV SUBSEQ: CPT | Performed by: DIETITIAN, REGISTERED

## 2024-10-17 ASSESSMENT — PATIENT HEALTH QUESTIONNAIRE - PHQ9
1. LITTLE INTEREST OR PLEASURE IN DOING THINGS: SEVERAL DAYS
9. THOUGHTS THAT YOU WOULD BE BETTER OFF DEAD, OR OF HURTING YOURSELF: NOT AT ALL
SUM OF ALL RESPONSES TO PHQ QUESTIONS 1-9: 2
SUM OF ALL RESPONSES TO PHQ QUESTIONS 1-9: 2
3. TROUBLE FALLING OR STAYING ASLEEP: SEVERAL DAYS
2. FEELING DOWN, DEPRESSED OR HOPELESS: NOT AT ALL
SUM OF ALL RESPONSES TO PHQ QUESTIONS 1-9: 2
SUM OF ALL RESPONSES TO PHQ QUESTIONS 1-9: 2
7. TROUBLE CONCENTRATING ON THINGS, SUCH AS READING THE NEWSPAPER OR WATCHING TELEVISION: NOT AT ALL
10. IF YOU CHECKED OFF ANY PROBLEMS, HOW DIFFICULT HAVE THESE PROBLEMS MADE IT FOR YOU TO DO YOUR WORK, TAKE CARE OF THINGS AT HOME, OR GET ALONG WITH OTHER PEOPLE: NOT DIFFICULT AT ALL
6. FEELING BAD ABOUT YOURSELF - OR THAT YOU ARE A FAILURE OR HAVE LET YOURSELF OR YOUR FAMILY DOWN: NOT AT ALL
4. FEELING TIRED OR HAVING LITTLE ENERGY: NOT AT ALL
5. POOR APPETITE OR OVEREATING: NOT AT ALL
8. MOVING OR SPEAKING SO SLOWLY THAT OTHER PEOPLE COULD HAVE NOTICED. OR THE OPPOSITE, BEING SO FIGETY OR RESTLESS THAT YOU HAVE BEEN MOVING AROUND A LOT MORE THAN USUAL: NOT AT ALL
SUM OF ALL RESPONSES TO PHQ9 QUESTIONS 1 & 2: 1

## 2024-10-17 ASSESSMENT — ANXIETY QUESTIONNAIRES
6. BECOMING EASILY ANNOYED OR IRRITABLE: SEVERAL DAYS
7. FEELING AFRAID AS IF SOMETHING AWFUL MIGHT HAPPEN: NOT AT ALL
2. NOT BEING ABLE TO STOP OR CONTROL WORRYING: NOT AT ALL
1. FEELING NERVOUS, ANXIOUS, OR ON EDGE: NOT AT ALL
3. WORRYING TOO MUCH ABOUT DIFFERENT THINGS: SEVERAL DAYS
GAD7 TOTAL SCORE: 4
5. BEING SO RESTLESS THAT IT IS HARD TO SIT STILL: SEVERAL DAYS
4. TROUBLE RELAXING: SEVERAL DAYS
IF YOU CHECKED OFF ANY PROBLEMS ON THIS QUESTIONNAIRE, HOW DIFFICULT HAVE THESE PROBLEMS MADE IT FOR YOU TO DO YOUR WORK, TAKE CARE OF THINGS AT HOME, OR GET ALONG WITH OTHER PEOPLE: NOT DIFFICULT AT ALL

## 2024-10-17 NOTE — PROGRESS NOTES
Nutrition follow up prior to surgery  Visit number:  6 out of 6 for Jaime en Y gastric bypass.    Initial Weight: 229 lbs    Wt Readings from Last 3 Encounters:   09/20/24 103.6 kg (228 lb 6.4 oz)   08/20/24 105.7 kg (233 lb)   07/25/24 105 kg (231 lb 7.7 oz)     lost 2 lbs over 1 month, down 3 lbs from initial.    Previous nutrition goals:   Continue current intake, replace ice cream with SF popsicles or frozen Greek yogurt  Avoid diet soda  Begin more structured exercise  Bring education manual to next visit    Nutrition goals: partially met, patient still including 4 oz soda and small amount of ice cream in the evening but is aware needs to avoid pre op.      Patient has completed required nutrition appointments and has met nutrition goals.  Reviewed importance of preoperative weight loss to promote a safer and easier surgery.  Patient is cleared from nutrition for bariatric surgery at this time.  Will follow up preoperatively as needed.      PES Statement:  Overweight/Obesity related to a complex combination of decreased energy needs, disordered eating patterns, physical inactivity, and increased psychological/life stress as evidenced by BMI greater than 30 and inability to maintain a significant amount of weight loss through conventional weight loss interventions.    Intervention:  Reviewed previous goals and set new goals.  Stressed importance of preoperative weight loss/maintenance or surgery may be postponed or cancelled.  Provided continued education regarding diet and lifestyle changes for optimal success post bariatric surgery.  Encouragement and support provided.    Education materials provided:   none    Plan/Recommendations:   Continue weight loss efforts  Avoid soda and ice cream  Prepare for 2 week pre op diet    Follow up: 1 week post op    Total time involved in direct patient education: 30 minutes    Nayla Venegas RD

## 2024-10-17 NOTE — PATIENT INSTRUCTIONS
Bariatric surgery is a powerful tool to help you lose weight, but it's not a magic bullet. It's just one part of your weight loss journey and requires your active participation to be successful. The surgery helps you eat less, but you'll need to make permanent changes to your diet, exercise habits, and overall lifestyle to achieve and maintain your weight loss goals. These changes are crucial for long-term success. This is a lifelong process; the surgery is just the beginning. You'll need to commit to new eating habits, regular exercise, and ongoing medical follow-ups for the rest of your life. It is also important to have realistic expectations. While you will likely lose significant weight, you may not reach your 'ideal' body image. Focus on health improvements rather than just appearance.       Surgery doesn't automatically fix emotional or psychological issues related to eating. Many patients have great success, but it's because they put in the hard work after surgery. They changed their diets, started exercising regularly, and addressed their emotional relationship with food. Lasting results take time; this isn't a quick fix, but a gradual process of changing your relationship with food and your body. Building a strong support system is crucial. This journey is easier when you have friends, family, and professionals helping you stay accountable and motivated. There will be challenges along the way, like plateaus in weight loss or the temptation to return to old habits; it's normal, and we're here to support you through these obstacles.    Additionally, your outcomes may be further improved if the following recommendations are adhered to:       1. Following surgery, you may benefit from additional supportive programming to help cope with the dietary restrictions required of you. Patients who belong to a support group are most likely to succeed in their weight loss regimen and maintain their weight loss long

## 2024-10-17 NOTE — PROGRESS NOTES
BARIATRIC PRE-SURGICAL BEHAVIORAL HEALTH FOLLOW UP  Davin Doshi Psy.D., \[facility]\""  Licensed Clinical Psychologist ([place] [id number])        Name: Augusto Jeffery  Date of Birth 1965  Visit Date: 10/17/2024   Time spent with Patient: 20 minutes.    Patient provided informed consent for behavioral health intervention. Discussed with patient the limits of confidentiality (i.e., abuse reporting, suicide intervention, review by treatment team, review by insurance company, etc.) and purpose of treatment. Patient indicated understanding.      SUBJECTIVE  Augusto presents for follow up of preoperative counseling, education, and behavioral support to assist with making behavior and lifestyle changes necessary for proposed bariatric surgery.      Previous Recommendations: Augusto must continue to prepare for post-operative lifestyle changes by exhibiting a reduction in consumption of unhealthy snacks and fast food; must evidence the elimination of nighttime snacking.     Augusto reports: doing well overall    Noted may end current relationship; identified current girlfriend as source of his stress.    Mood: \"Better\"  Sleep: \"I'm getting about 6-7 hours\" -reports is using AutoPAP nightly; has been cleared by Pulmonology  Medication: n/a    Eating Habits: shake for breakfast, 2 meals and snacks (grapes, nuts). Denied any night eating. Indicated dinner is often eating out due to lack of working stove at current, but notes he opts for healthy menu options (grilled chicken, etc.)  Activity/Exercise: walking while working and walks dog in evening; plans to get membership to gym and use treadmill there    Current Weight: 226.2 lbs -5.2 lbs in 3 months; -3.2 lbs since starting our bariatric program  Current BMI: 37.64    Cleared by [facility] 5/8/24  Cleared by Pulmonology 9/18/24      OBJECTIVE  MENTAL STATUS EXAM:  Mood was \"better\" with Neutral/Euthymic(normal) affect.   Suicidal ideation was denied.   Homicidal ideation was

## 2024-10-18 LAB
AMPHET CTO UR CFM-MCNC: NEGATIVE NG/ML
ANNOTATION COMMENT IMP: NORMAL
BARBITURATES CTO UR CFM-MCNC: NEGATIVE NG/ML
BENZODIAZ CTO UR CFM-MCNC: NEGATIVE NG/ML
CANNABINOIDS CTO UR CFM-MCNC: NEGATIVE NG/ML
COCAINE CTO UR CFM-MCNC: NEGATIVE NG/ML
CREAT UR-MCNC: 69.9 MG/DL (ref 20–400)
ETHANOL UR CFM-MCNC: NEGATIVE MG/DL
METHADONE CTO UR CFM-MCNC: NEGATIVE NG/ML
OPIATES CTO UR CFM-MCNC: NEGATIVE NG/ML
PCP CTO UR CFM-MCNC: NEGATIVE NG/ML
PROPOXYPH CTO UR CFM-MCNC: NEGATIVE NG/ML

## 2024-10-23 ENCOUNTER — TELEPHONE (OUTPATIENT)
Dept: GASTROENTEROLOGY | Age: 59
End: 2024-10-23

## 2024-10-23 DIAGNOSIS — Z12.11 COLON CANCER SCREENING: Primary | ICD-10-CM

## 2024-10-23 NOTE — TELEPHONE ENCOUNTER
Spoke to patient, agreeable to colonoscopy. Patient scheduled 11/1/2024 at 10:30 AM. Miralax Prep mailed to patient. Referral pended to PCP. Info faxed to [facility].

## 2024-10-25 ENCOUNTER — PREP FOR PROCEDURE (OUTPATIENT)
Dept: BARIATRICS/WEIGHT MGMT | Age: 59
End: 2024-10-25

## 2024-10-25 DIAGNOSIS — E66.01 MORBID (SEVERE) OBESITY DUE TO EXCESS CALORIES: ICD-10-CM

## 2024-10-28 ENCOUNTER — NURSE ONLY (OUTPATIENT)
Dept: BARIATRICS/WEIGHT MGMT | Age: 59
End: 2024-10-28

## 2024-10-28 DIAGNOSIS — E66.01 MORBID (SEVERE) OBESITY DUE TO EXCESS CALORIES: Primary | ICD-10-CM

## 2024-10-28 NOTE — PROGRESS NOTES
Patient attended pre-op class on 10/28/2024. Reviewed with patient pre-op instructions, PAT appointment, and pre-op diet instructions. Reviewed pre-op diet and start date.  Patient provided opportunity to ask questions. Patient verbalizes understanding of instructions.  Handouts provided: Pre-op Class Power Point presentation,  Pre-Op Instructions,  Approved Pre-Packaged Meals, Protein Shots and Protein Water/Drinks, and Hydration Tracker. Patient instructed on post-op hydration goals, tracking, and provided complementary 32 oz water bottle to assist in tracking. Reviewed patient contract. Patient signed contract and agrees to follow program guidelines. Patient provided opportunity to ask questions.      PRE-OP INSTRUCTIONS  Name: ___________________________________________  _____________  You have been scheduled for ____________________ surgery on _____________.   Your procedure will be performed at: [facility]. You will receive a call from the hospital between 2pm-5pm the evening before with arrival time.  PAT scheduled on __________ at _________.  Check-in at Surgery Welcome Desk.    Follow these Instructions Carefully   Failure to comply with these instructions may result in a cancellation of your surgery.    Start Liver Shrinking Diet: Date: ____________  If your BMI is <50, you will start the pre-op diet 2 weeks before surgery.  If your BMI is >=50, you will start the pre-op diet 4 weeks before surgery.    2 - 4 weeks before surgery: Discontinue birth control pills. Condoms with spermicide should be used instead. IUD's, Depo shots, and Implanon implants are acceptable.    At your 2 week Pre-op visit, the doctor will send prescriptions for bowel prep and aprepitant (Emend) 125 mg x 1 to [facility].    HOLD GPL-1 medications (I.E. Ozempic, Trulicity, Byetta, Victoza, Rybelsus) 1 week before surgery.    HOLD blood thinners 7 days prior to surgery (Coumadin, Eliquis, Plavix,

## 2024-10-31 ENCOUNTER — OFFICE VISIT (OUTPATIENT)
Dept: BARIATRICS/WEIGHT MGMT | Age: 59
End: 2024-10-31
Payer: MEDICAID

## 2024-10-31 VITALS
BODY MASS INDEX: 36.99 KG/M2 | OXYGEN SATURATION: 95 % | WEIGHT: 222 LBS | HEART RATE: 89 BPM | HEIGHT: 65 IN | DIASTOLIC BLOOD PRESSURE: 68 MMHG | SYSTOLIC BLOOD PRESSURE: 110 MMHG

## 2024-10-31 PROCEDURE — 99215 OFFICE O/P EST HI 40 MIN: CPT | Performed by: SURGERY

## 2024-10-31 PROCEDURE — 3078F DIAST BP <80 MM HG: CPT | Performed by: SURGERY

## 2024-10-31 PROCEDURE — 3074F SYST BP LT 130 MM HG: CPT | Performed by: SURGERY

## 2024-10-31 RX ORDER — APREPITANT 125 MG/1
125 CAPSULE ORAL ONCE
Qty: 1 CAPSULE | Refills: 0 | Status: SHIPPED | OUTPATIENT
Start: 2024-10-31 | End: 2024-10-31

## 2024-10-31 RX ORDER — POLYETHYLENE GLYCOL 3350, SODIUM SULFATE ANHYDROUS, SODIUM BICARBONATE, SODIUM CHLORIDE, POTASSIUM CHLORIDE 236; 22.74; 6.74; 5.86; 2.97 G/4L; G/4L; G/4L; G/4L; G/4L
4 POWDER, FOR SOLUTION ORAL ONCE
Qty: 4000 ML | Refills: 0 | Status: SHIPPED | OUTPATIENT
Start: 2024-10-31 | End: 2024-10-31

## 2024-10-31 RX ORDER — TOPIRAMATE 50 MG/1
50 TABLET, FILM COATED ORAL 2 TIMES DAILY
Qty: 60 TABLET | Refills: 3 | Status: SHIPPED | OUTPATIENT
Start: 2024-10-31

## 2024-11-04 RX ORDER — SODIUM CHLORIDE, SODIUM LACTATE, POTASSIUM CHLORIDE, CALCIUM CHLORIDE 600; 310; 30; 20 MG/100ML; MG/100ML; MG/100ML; MG/100ML
INJECTION, SOLUTION INTRAVENOUS CONTINUOUS
OUTPATIENT
Start: 2024-11-04

## 2024-11-04 RX ORDER — SODIUM CHLORIDE 9 MG/ML
INJECTION, SOLUTION INTRAVENOUS PRN
OUTPATIENT
Start: 2024-11-04

## 2024-11-04 RX ORDER — SODIUM CHLORIDE 0.9 % (FLUSH) 0.9 %
5-40 SYRINGE (ML) INJECTION EVERY 12 HOURS SCHEDULED
OUTPATIENT
Start: 2024-11-04

## 2024-11-04 RX ORDER — ACETAMINOPHEN 325 MG/1
1000 TABLET ORAL ONCE
OUTPATIENT
Start: 2024-11-04 | End: 2024-11-04

## 2024-11-04 RX ORDER — HEPARIN SODIUM 5000 [USP'U]/ML
5000 INJECTION, SOLUTION INTRAVENOUS; SUBCUTANEOUS ONCE
OUTPATIENT
Start: 2024-11-04 | End: 2024-11-04

## 2024-11-04 RX ORDER — SODIUM CHLORIDE 0.9 % (FLUSH) 0.9 %
5-40 SYRINGE (ML) INJECTION PRN
OUTPATIENT
Start: 2024-11-04

## 2024-11-04 RX ORDER — ONDANSETRON 2 MG/ML
4 INJECTION INTRAMUSCULAR; INTRAVENOUS ONCE
OUTPATIENT
Start: 2024-11-04 | End: 2024-11-04

## 2024-11-04 NOTE — PROGRESS NOTES
Surgery Consultation    Patient Name:  :  Hospital Day:   Augusto Jeffery 1965 [unfilled]     Date of Service: 2024    Subjective:      History of Present Illness:    Augusto Jeffery  is a 58 y.o. male referred by Dr. Bucio of Cardiology for morbid obesity and diabetes  Augusto Jeffery reports multiple episodes of weight loss and regain after multiple diet and exercise programs.     Augusto is claustrophobic and refuses to use a face mask or nose piece for DENEEN.  He has had 4 general anesthesia experiences without any respiratory issue post operatively.     Augusto was on Plavix for a stent but has been off a few months.    Augusto Jeffery has completed the pre-op process for Bariatric surgery and is currently on the pre-op diet. Per our Psychologist, Dr. Nunez, there appears to be no active psychiatric contraindications to patient receiving bariatric surgery at this time; there is no evidence of untreated/undertreated clinically significant general psychopathology or substance/alcohol use disorders.        Past Medical History:   Diagnosis Date    ADHD (attention deficit hyperactivity disorder)     Depression     GERD (gastroesophageal reflux disease)     History of blood transfusion     Hyperlipidemia     Hypertension     S/P left heart catheterization by percutaneous approach     Sleep apnea     reports noncompliance with machine     Past Surgical History:   Procedure Laterality Date    ANKLE SURGERY Right     fixation    ARM SURGERY Right     laceration repair, tendon repair     CHOLECYSTECTOMY  2013    Dr Wilbur Christianson    HAND SURGERY Right     laceration repair    OTHER SURGICAL HISTORY  06/15/2023    anterior artery stent Dr. Pendleton    UPPER GASTROINTESTINAL ENDOSCOPY N/A 2024    EGD ESOPHAGOGASTRODUODENOSCOPY with biopsy performed by Mainor Vazquez MD at [facility]        Family History   Problem Relation Age of Onset    Stroke Father     Kidney Disease Father

## 2024-11-06 ENCOUNTER — HOSPITAL ENCOUNTER (OUTPATIENT)
Dept: PREADMISSION TESTING | Age: 59
Discharge: HOME | End: 2024-11-10
Payer: MEDICAID

## 2024-11-06 VITALS
HEART RATE: 85 BPM | SYSTOLIC BLOOD PRESSURE: 115 MMHG | HEIGHT: 65 IN | DIASTOLIC BLOOD PRESSURE: 81 MMHG | TEMPERATURE: 98.3 F | RESPIRATION RATE: 18 BRPM | WEIGHT: 216.8 LBS | BODY MASS INDEX: 36.12 KG/M2 | OXYGEN SATURATION: 98 %

## 2024-11-06 PROCEDURE — 93005 ELECTROCARDIOGRAM TRACING: CPT | Performed by: SURGERY

## 2024-11-07 LAB
EKG ATRIAL RATE: 72 BPM
EKG P AXIS: 44 DEGREES
EKG P-R INTERVAL: 184 MS
EKG Q-T INTERVAL: 396 MS
EKG QRS DURATION: 92 MS
EKG QTC CALCULATION (BAZETT): 433 MS
EKG R AXIS: -2 DEGREES
EKG T AXIS: 8 DEGREES
EKG VENTRICULAR RATE: 72 BPM

## 2024-11-07 PROCEDURE — 93010 ELECTROCARDIOGRAM REPORT: CPT | Performed by: INTERNAL MEDICINE

## 2025-05-13 ENCOUNTER — COMMUNITY OUTREACH (OUTPATIENT)
Dept: FAMILY MEDICINE CLINIC | Age: 60
End: 2025-05-13

## 2025-05-14 ENCOUNTER — APPOINTMENT (OUTPATIENT)
Dept: CARDIOLOGY | Facility: CLINIC | Age: 60
End: 2025-05-14
Payer: MEDICAID

## 2025-06-02 ENCOUNTER — HOSPITAL ENCOUNTER (OUTPATIENT)
Age: 60
Setting detail: SPECIMEN
Discharge: HOME | End: 2025-06-02
Payer: MEDICAID

## 2025-06-02 ENCOUNTER — OFFICE VISIT (OUTPATIENT)
Dept: FAMILY MEDICINE CLINIC | Age: 60
End: 2025-06-02
Payer: MEDICAID

## 2025-06-02 VITALS
OXYGEN SATURATION: 97 % | BODY MASS INDEX: 37.55 KG/M2 | HEIGHT: 65 IN | HEART RATE: 91 BPM | DIASTOLIC BLOOD PRESSURE: 80 MMHG | SYSTOLIC BLOOD PRESSURE: 130 MMHG | WEIGHT: 225.4 LBS

## 2025-06-02 DIAGNOSIS — Z00.00 ANNUAL PHYSICAL EXAM: ICD-10-CM

## 2025-06-02 DIAGNOSIS — I25.119 CORONARY ARTERY DISEASE INVOLVING NATIVE CORONARY ARTERY OF NATIVE HEART WITH ANGINA PECTORIS: ICD-10-CM

## 2025-06-02 DIAGNOSIS — E78.5 HYPERLIPIDEMIA, UNSPECIFIED HYPERLIPIDEMIA TYPE: ICD-10-CM

## 2025-06-02 DIAGNOSIS — N52.9 ERECTILE DYSFUNCTION, UNSPECIFIED ERECTILE DYSFUNCTION TYPE: ICD-10-CM

## 2025-06-02 DIAGNOSIS — Z12.11 COLON CANCER SCREENING: ICD-10-CM

## 2025-06-02 DIAGNOSIS — Z00.00 ANNUAL PHYSICAL EXAM: Primary | ICD-10-CM

## 2025-06-02 DIAGNOSIS — I10 ESSENTIAL HYPERTENSION: ICD-10-CM

## 2025-06-02 DIAGNOSIS — Z23 ENCOUNTER FOR IMMUNIZATION: ICD-10-CM

## 2025-06-02 DIAGNOSIS — Z11.4 ENCOUNTER FOR SCREENING FOR HIV: ICD-10-CM

## 2025-06-02 DIAGNOSIS — J30.2 SEASONAL ALLERGIES: ICD-10-CM

## 2025-06-02 PROBLEM — E66.01 MORBID (SEVERE) OBESITY DUE TO EXCESS CALORIES (HCC): Status: RESOLVED | Noted: 2024-10-25 | Resolved: 2025-06-02

## 2025-06-02 PROBLEM — E66.01 MORBID OBESITY (HCC): Status: RESOLVED | Noted: 2024-04-29 | Resolved: 2025-06-02

## 2025-06-02 LAB
ALBUMIN SERPL-MCNC: 4.2 G/DL (ref 3.5–4.6)
ALP SERPL-CCNC: 77 U/L (ref 35–104)
ALT SERPL-CCNC: 13 U/L (ref 0–41)
ANION GAP SERPL CALCULATED.3IONS-SCNC: 12 MEQ/L (ref 9–15)
AST SERPL-CCNC: 17 U/L (ref 0–40)
BILIRUB SERPL-MCNC: 0.4 MG/DL (ref 0.2–0.7)
BUN SERPL-MCNC: 12 MG/DL (ref 6–20)
CALCIUM SERPL-MCNC: 9.6 MG/DL (ref 8.5–9.9)
CHLORIDE SERPL-SCNC: 102 MEQ/L (ref 95–107)
CHOLEST SERPL-MCNC: 175 MG/DL (ref 0–199)
CO2 SERPL-SCNC: 23 MEQ/L (ref 20–31)
CREAT SERPL-MCNC: 1.16 MG/DL (ref 0.7–1.2)
GLOBULIN SER CALC-MCNC: 2.9 G/DL (ref 2.3–3.5)
GLUCOSE SERPL-MCNC: 106 MG/DL (ref 70–99)
HDLC SERPL-MCNC: 37 MG/DL (ref 40–59)
LDLC SERPL CALC-MCNC: 107 MG/DL (ref 0–129)
POTASSIUM SERPL-SCNC: 4.7 MEQ/L (ref 3.4–4.9)
PROT SERPL-MCNC: 7.1 G/DL (ref 6.3–8)
SODIUM SERPL-SCNC: 137 MEQ/L (ref 135–144)
TRIGL SERPL-MCNC: 156 MG/DL (ref 0–150)

## 2025-06-02 PROCEDURE — 80061 LIPID PANEL: CPT

## 2025-06-02 PROCEDURE — 36415 COLL VENOUS BLD VENIPUNCTURE: CPT | Performed by: FAMILY MEDICINE

## 2025-06-02 PROCEDURE — 3079F DIAST BP 80-89 MM HG: CPT | Performed by: FAMILY MEDICINE

## 2025-06-02 PROCEDURE — 3075F SYST BP GE 130 - 139MM HG: CPT | Performed by: FAMILY MEDICINE

## 2025-06-02 PROCEDURE — 90471 IMMUNIZATION ADMIN: CPT | Performed by: FAMILY MEDICINE

## 2025-06-02 PROCEDURE — 83036 HEMOGLOBIN GLYCOSYLATED A1C: CPT

## 2025-06-02 PROCEDURE — 99396 PREV VISIT EST AGE 40-64: CPT | Performed by: FAMILY MEDICINE

## 2025-06-02 PROCEDURE — 87389 HIV-1 AG W/HIV-1&-2 AB AG IA: CPT

## 2025-06-02 PROCEDURE — 90677 PCV20 VACCINE IM: CPT | Performed by: FAMILY MEDICINE

## 2025-06-02 PROCEDURE — 80053 COMPREHEN METABOLIC PANEL: CPT

## 2025-06-02 PROCEDURE — 99213 OFFICE O/P EST LOW 20 MIN: CPT | Performed by: FAMILY MEDICINE

## 2025-06-02 RX ORDER — SILDENAFIL 50 MG/1
50 TABLET, FILM COATED ORAL DAILY PRN
Qty: 10 TABLET | Refills: 0 | Status: SHIPPED | OUTPATIENT
Start: 2025-06-02

## 2025-06-02 RX ORDER — ATORVASTATIN CALCIUM 40 MG/1
40 TABLET, FILM COATED ORAL DAILY
Qty: 90 TABLET | Refills: 3 | Status: SHIPPED | OUTPATIENT
Start: 2025-06-02

## 2025-06-02 RX ORDER — MELOXICAM 15 MG/1
15 TABLET ORAL DAILY
COMMUNITY
Start: 2024-11-29

## 2025-06-02 RX ORDER — METOPROLOL SUCCINATE 50 MG/1
50 TABLET, EXTENDED RELEASE ORAL DAILY
Qty: 90 TABLET | Refills: 3 | Status: SHIPPED | OUTPATIENT
Start: 2025-06-02

## 2025-06-02 RX ORDER — PREDNISONE 20 MG/1
TABLET ORAL
COMMUNITY
Start: 2025-05-30 | End: 2025-06-02

## 2025-06-02 RX ORDER — FEXOFENADINE HCL 180 MG/1
180 TABLET ORAL DAILY
Qty: 90 TABLET | Refills: 3 | Status: SHIPPED | OUTPATIENT
Start: 2025-06-02

## 2025-06-02 SDOH — ECONOMIC STABILITY: FOOD INSECURITY: WITHIN THE PAST 12 MONTHS, THE FOOD YOU BOUGHT JUST DIDN'T LAST AND YOU DIDN'T HAVE MONEY TO GET MORE.: NEVER TRUE

## 2025-06-02 SDOH — ECONOMIC STABILITY: FOOD INSECURITY: WITHIN THE PAST 12 MONTHS, YOU WORRIED THAT YOUR FOOD WOULD RUN OUT BEFORE YOU GOT MONEY TO BUY MORE.: NEVER TRUE

## 2025-06-02 ASSESSMENT — PATIENT HEALTH QUESTIONNAIRE - PHQ9
SUM OF ALL RESPONSES TO PHQ QUESTIONS 1-9: 0
SUM OF ALL RESPONSES TO PHQ QUESTIONS 1-9: 0
2. FEELING DOWN, DEPRESSED OR HOPELESS: NOT AT ALL
2. FEELING DOWN, DEPRESSED OR HOPELESS: NOT AT ALL
1. LITTLE INTEREST OR PLEASURE IN DOING THINGS: NOT AT ALL
SUM OF ALL RESPONSES TO PHQ QUESTIONS 1-9: 0
SUM OF ALL RESPONSES TO PHQ QUESTIONS 1-9: 0
1. LITTLE INTEREST OR PLEASURE IN DOING THINGS: NOT AT ALL
SUM OF ALL RESPONSES TO PHQ9 QUESTIONS 1 & 2: 0

## 2025-06-02 NOTE — PROGRESS NOTES
EGD ESOPHAGOGASTRODUODENOSCOPY with biopsy performed by Mainor Vazquez MD at [facility]     Social History     Socioeconomic History    Marital status:      Spouse name: Not on file    Number of children: Not on file    Years of education: Not on file    Highest education level: Not on file   Occupational History    Not on file   Tobacco Use    Smoking status: Never    Smokeless tobacco: Never   Vaping Use    Vaping status: Never Used   Substance and Sexual Activity    Alcohol use: No    Drug use: No    Sexual activity: Yes     Partners: Female   Other Topics Concern    Not on file   Social History Narrative    Not on file     Social Drivers of Health     Financial Resource Strain: Low Risk  (11/7/2023)    Overall Financial Resource Strain (CARDIA)     Difficulty of Paying Living Expenses: Not hard at all   Food Insecurity: No Food Insecurity (6/2/2025)    Hunger Vital Sign     Worried About Running Out of Food in the Last Year: Never true     Ran Out of Food in the Last Year: Never true   Transportation Needs: No Transportation Needs (6/2/2025)    PRAPARE - Transportation     Lack of Transportation (Medical): No     Lack of Transportation (Non-Medical): No   Physical Activity: Unknown (5/15/2024)    Exercise Vital Sign     Days of Exercise per Week: 0 days     Minutes of Exercise per Session: Not on file   Stress: Not on file   Social Connections: Not on file   Intimate Partner Violence: Not on file   Housing Stability: Low Risk  (6/2/2025)    Housing Stability Vital Sign     Unable to Pay for Housing in the Last Year: No     Number of Times Moved in the Last Year: 0     Homeless in the Last Year: No     Family History   Problem Relation Age of Onset    Stroke Father     Kidney Disease Father     Heart Disease Mother     Cancer Mother         kidney diagnosed at age 67      Allergies   Allergen Reactions    Hydrocodone Nausea Only     Current Outpatient Medications   Medication Sig Dispense

## 2025-06-03 ENCOUNTER — RESULTS FOLLOW-UP (OUTPATIENT)
Dept: FAMILY MEDICINE CLINIC | Age: 60
End: 2025-06-03

## 2025-06-03 LAB
ESTIMATED AVERAGE GLUCOSE: 120 MG/DL
HBA1C MFR BLD: 5.8 % (ref 4–6)
HIV AG/AB: NONREACTIVE

## 2025-06-11 ENCOUNTER — PREP FOR PROCEDURE (OUTPATIENT)
Dept: GASTROENTEROLOGY | Age: 60
End: 2025-06-11

## 2025-06-11 RX ORDER — SODIUM CHLORIDE 0.9 % (FLUSH) 0.9 %
5-40 SYRINGE (ML) INJECTION PRN
Status: CANCELLED | OUTPATIENT
Start: 2025-06-11

## 2025-06-11 RX ORDER — SODIUM CHLORIDE 9 MG/ML
INJECTION, SOLUTION INTRAVENOUS CONTINUOUS
Status: CANCELLED | OUTPATIENT
Start: 2025-06-11

## 2025-06-11 RX ORDER — SODIUM CHLORIDE 9 MG/ML
INJECTION, SOLUTION INTRAVENOUS PRN
Status: CANCELLED | OUTPATIENT
Start: 2025-06-11

## 2025-06-11 RX ORDER — SODIUM CHLORIDE 0.9 % (FLUSH) 0.9 %
5-40 SYRINGE (ML) INJECTION EVERY 12 HOURS SCHEDULED
Status: CANCELLED | OUTPATIENT
Start: 2025-06-11

## 2025-06-16 RX ORDER — POLYETHYLENE GLYCOL 3350, SODIUM CHLORIDE, SODIUM BICARBONATE, POTASSIUM CHLORIDE 420; 11.2; 5.72; 1.48 G/4L; G/4L; G/4L; G/4L
4000 POWDER, FOR SOLUTION ORAL ONCE
Qty: 4000 ML | Refills: 0 | Status: SHIPPED | OUTPATIENT
Start: 2025-06-16 | End: 2025-06-16

## 2025-07-07 ENCOUNTER — ANESTHESIA (OUTPATIENT)
Dept: OPERATING ROOM | Age: 60
End: 2025-07-07
Payer: MEDICAID

## 2025-07-07 ENCOUNTER — ANESTHESIA EVENT (OUTPATIENT)
Dept: OPERATING ROOM | Age: 60
End: 2025-07-07
Payer: MEDICAID

## 2025-07-07 ENCOUNTER — HOSPITAL ENCOUNTER (OUTPATIENT)
Age: 60
Setting detail: OUTPATIENT SURGERY
Discharge: HOME | End: 2025-07-07
Attending: SPECIALIST | Admitting: SPECIALIST
Payer: MEDICAID

## 2025-07-07 VITALS
WEIGHT: 220 LBS | OXYGEN SATURATION: 95 % | HEART RATE: 58 BPM | TEMPERATURE: 96.6 F | BODY MASS INDEX: 36.65 KG/M2 | RESPIRATION RATE: 20 BRPM | SYSTOLIC BLOOD PRESSURE: 123 MMHG | HEIGHT: 65 IN | DIASTOLIC BLOOD PRESSURE: 76 MMHG

## 2025-07-07 DIAGNOSIS — Z12.11 COLON CANCER SCREENING: ICD-10-CM

## 2025-07-07 PROCEDURE — 88305 TISSUE EXAM BY PATHOLOGIST: CPT

## 2025-07-07 PROCEDURE — 45385 COLONOSCOPY W/LESION REMOVAL: CPT | Performed by: SPECIALIST

## 2025-07-07 PROCEDURE — 2580000003 HC RX 258: Performed by: SPECIALIST

## 2025-07-07 PROCEDURE — 2500000003 HC RX 250 WO HCPCS: Performed by: SPECIALIST

## 2025-07-07 PROCEDURE — 3609027000 HC COLONOSCOPY: Performed by: SPECIALIST

## 2025-07-07 PROCEDURE — 2709999900 HC NON-CHARGEABLE SUPPLY: Performed by: SPECIALIST

## 2025-07-07 PROCEDURE — 7100000010 HC PHASE II RECOVERY - FIRST 15 MIN: Performed by: SPECIALIST

## 2025-07-07 PROCEDURE — 6360000002 HC RX W HCPCS

## 2025-07-07 PROCEDURE — 3700000000 HC ANESTHESIA ATTENDED CARE: Performed by: SPECIALIST

## 2025-07-07 PROCEDURE — 3700000001 HC ADD 15 MINUTES (ANESTHESIA): Performed by: SPECIALIST

## 2025-07-07 PROCEDURE — 7100000011 HC PHASE II RECOVERY - ADDTL 15 MIN: Performed by: SPECIALIST

## 2025-07-07 RX ORDER — SODIUM CHLORIDE 9 MG/ML
INJECTION, SOLUTION INTRAVENOUS CONTINUOUS
Status: DISCONTINUED | OUTPATIENT
Start: 2025-07-07 | End: 2025-07-07 | Stop reason: HOSPADM

## 2025-07-07 RX ORDER — LIDOCAINE HYDROCHLORIDE 20 MG/ML
INJECTION, SOLUTION INFILTRATION; PERINEURAL
Status: DISCONTINUED | OUTPATIENT
Start: 2025-07-07 | End: 2025-07-07 | Stop reason: SDUPTHER

## 2025-07-07 RX ORDER — SODIUM CHLORIDE 0.9 % (FLUSH) 0.9 %
5-40 SYRINGE (ML) INJECTION EVERY 12 HOURS SCHEDULED
Status: DISCONTINUED | OUTPATIENT
Start: 2025-07-07 | End: 2025-07-07 | Stop reason: HOSPADM

## 2025-07-07 RX ORDER — SODIUM CHLORIDE 9 MG/ML
INJECTION, SOLUTION INTRAVENOUS PRN
Status: DISCONTINUED | OUTPATIENT
Start: 2025-07-07 | End: 2025-07-07 | Stop reason: HOSPADM

## 2025-07-07 RX ORDER — SODIUM CHLORIDE 0.9 % (FLUSH) 0.9 %
5-40 SYRINGE (ML) INJECTION PRN
Status: DISCONTINUED | OUTPATIENT
Start: 2025-07-07 | End: 2025-07-07 | Stop reason: HOSPADM

## 2025-07-07 RX ORDER — PROPOFOL 10 MG/ML
INJECTION, EMULSION INTRAVENOUS
Status: DISCONTINUED | OUTPATIENT
Start: 2025-07-07 | End: 2025-07-07 | Stop reason: SDUPTHER

## 2025-07-07 RX ADMIN — PROPOFOL 50 MG: 10 INJECTION, EMULSION INTRAVENOUS at 10:13

## 2025-07-07 RX ADMIN — LIDOCAINE HYDROCHLORIDE 3 ML: 20 INJECTION, SOLUTION INFILTRATION; PERINEURAL at 09:55

## 2025-07-07 RX ADMIN — SODIUM CHLORIDE: 0.9 INJECTION, SOLUTION INTRAVENOUS at 08:39

## 2025-07-07 RX ADMIN — PROPOFOL 50 MG: 10 INJECTION, EMULSION INTRAVENOUS at 09:59

## 2025-07-07 RX ADMIN — PROPOFOL 50 MG: 10 INJECTION, EMULSION INTRAVENOUS at 10:03

## 2025-07-07 RX ADMIN — PROPOFOL 50 MG: 10 INJECTION, EMULSION INTRAVENOUS at 10:06

## 2025-07-07 RX ADMIN — PROPOFOL 100 MG: 10 INJECTION, EMULSION INTRAVENOUS at 09:55

## 2025-07-07 ASSESSMENT — PAIN - FUNCTIONAL ASSESSMENT: PAIN_FUNCTIONAL_ASSESSMENT: 0-10

## 2025-07-07 NOTE — ANESTHESIA POSTPROCEDURE EVALUATION
Department of Anesthesiology  Postprocedure Note    Patient: Augusto Jeffery  MRN: [id number]  YOB: 1965  Date of evaluation: 7/7/2025    Procedure Summary       Date: 07/07/25 Room / Location: [address]    Anesthesia Start: 0952 Anesthesia Stop: 1018    Procedure: COLORECTAL CANCER SCREENING, NOT HIGH RISK with polypectomies Diagnosis:       Colon cancer screening      (Colon cancer screening [Z12.11])    Surgeons: Sal Diane MD Responsible Provider: Shwetha Saravia APRN - CRNA    Anesthesia Type: MAC ASA Status: 3            Anesthesia Type: No value filed.    Daniela Phase I: Daniela Score: 10    Daniela Phase II:      Anesthesia Post Evaluation    Patient location during evaluation: bedside  Patient participation: complete - patient participated  Level of consciousness: awake and awake and alert  Airway patency: patent  Nausea & Vomiting: no nausea and no vomiting  Cardiovascular status: blood pressure returned to baseline and hemodynamically stable  Respiratory status: acceptable  Hydration status: euvolemic  Pain management: adequate        No notable events documented.

## 2025-07-07 NOTE — ANESTHESIA PRE PROCEDURE
Department of Anesthesiology  Preprocedure Note       Name:  Augusto Jeffery   Age:  59 y.o.  :  1965                                          MRN:  [id number]         Date:  2025      Surgeon: Surgeon(s):  Sal Diane MD    Procedure: Procedure(s):  COLORECTAL CANCER SCREENING, NOT HIGH RISK    Medications prior to admission:   Prior to Admission medications    Medication Sig Start Date End Date Taking? Authorizing Provider   meloxicam (MOBIC) 15 MG tablet Take 1 tablet by mouth daily 24   Cynthia Sellers MD   fexofenadine (ALLEGRA) 180 MG tablet Take 1 tablet by mouth daily 25   Tomas Bucio MD   metoprolol succinate (TOPROL XL) 50 MG extended release tablet Take 1 tablet by mouth daily 25   Tomas Bucio MD   atorvastatin (LIPITOR) 40 MG tablet Take 1 tablet by mouth daily 25   Tomas Bucio MD   sildenafil (VIAGRA) 50 MG tablet Take 1 tablet by mouth daily as needed for Erectile Dysfunction (take 45min prior to sexual activity) 25   Tomas Bucio MD   nitroGLYCERIN (NITROSTAT) 0.4 MG SL tablet  10/19/23   ProviderCynthia MD       Current medications:    No current facility-administered medications for this encounter.       Allergies:    Allergies   Allergen Reactions    Hydrocodone Nausea Only       Problem List:    Patient Active Problem List   Diagnosis Code    Essential hypertension I10    Hyperlipidemia E78.5    DENEEN (obstructive sleep apnea) G47.33    Anxiety F41.9    BPH (benign prostatic hyperplasia) N40.0    Coronary artery disease involving native coronary artery of native heart with angina pectoris I25.119    Localized osteoarthritis of left knee M17.12       Past Medical History:        Diagnosis Date    ADHD (attention deficit hyperactivity disorder)     CAD (coronary artery disease)     Depression     GERD (gastroesophageal reflux disease)     History of blood transfusion     Hyperlipidemia     Hypertension     S/P left heart catheterization by

## 2025-07-07 NOTE — H&P
Allergies:   Allergies   Allergen Reactions    Hydrocodone Nausea Only        History of allergic reaction to anesthesia:  No    Past Medical History:  Past Medical History:   Diagnosis Date    ADHD (attention deficit hyperactivity disorder)     CAD (coronary artery disease)     Depression     GERD (gastroesophageal reflux disease)     History of blood transfusion     Hyperlipidemia     Hypertension     S/P left heart catheterization by percutaneous approach 2000    Sleep apnea     uses cpap       Past Surgical History:  Past Surgical History:   Procedure Laterality Date    ANKLE SURGERY Right     fixation    ARM SURGERY Right     laceration repair, tendon repair     CHOLECYSTECTOMY  01/01/2013    Dr Wilbur Christianson    CORONARY ANGIOPLASTY WITH STENT PLACEMENT  06/15/2023    HAND SURGERY Right     laceration repair    OTHER SURGICAL HISTORY  06/15/2023    anterior artery stent Dr. Pendleton    UPPER GASTROINTESTINAL ENDOSCOPY N/A 05/22/2024    EGD ESOPHAGOGASTRODUODENOSCOPY with biopsy performed by Mainor Vazquez MD at [facility]       Social History:  Social History     Tobacco Use    Smoking status: Never    Smokeless tobacco: Never   Vaping Use    Vaping status: Never Used   Substance Use Topics    Alcohol use: No    Drug use: No       Vital Signs:   Vitals:    07/07/25 0830   BP: (!) 147/92   Pulse: 73   Resp: 18   Temp: (!) 96.6 °F (35.9 °C)   SpO2: 97%        Physical Exam:  Cardiac:  [x]WNL  []Comments:  Pulmonary:  [x]WNL   []Comments:   Neuro/Mental Status:  [x]WNL  []Comments:  Abdominal:  [x]WNL    []Comments:  Other:   []WNL  []Comments:    Informed Consent:  The risks and benefits of the procedure have been discussed with either the patient or if they cannot consent, their representative.    Assessment:  Patient examined and appropriate for planned sedation and procedure.     Plan:  Proceed with planned sedation and procedure as above.    Sal Diane MD  9:49 AM

## 2025-08-06 PROBLEM — Z12.11 COLON CANCER SCREENING: Status: RESOLVED | Noted: 2025-07-07 | Resolved: 2025-08-06

## (undated) DEVICE — TUBING, SUCTION, 9/32" X 12', STRAIGHT: Brand: MEDLINE INDUSTRIES, INC.

## (undated) DEVICE — FORCEPS BX L240CM JAW DIA2.4MM ORNG L CAP W/ NDL DISP RAD

## (undated) DEVICE — TUBING IRRIGATION 140/160/180/190 SER GI ENDOSCP SMARTCAP

## (undated) DEVICE — SUPPLEMENT DIGESTIVE H2O SOL GI-EASE

## (undated) DEVICE — TRAP POLYP BALEEN

## (undated) DEVICE — ADAPTER FLSH PMP FLD MGMT GI IRRIG OFP 2 DISPOSABLE

## (undated) DEVICE — ENDOSCOPIC TRAY TRNSPRT 20.5X16.5X4.1 IN RECYCL SUGAR PULP

## (undated) DEVICE — TUBE SET 96 MM 64 MM H2O PERISTALTIC STD AUX CHANNEL

## (undated) DEVICE — SNARE VASC L240CM LOOP W10MM SHTH DIA2.4MM RND STIFF CLD

## (undated) DEVICE — ENDO CARRY-ON PROCEDURE KIT INCLUDES LUBRICANT, DEFENDO OLYMPUS AIR, WATER, SUCTION, BIOPSY VALVE KIT, ENZYMATIC SPONGE, AND BASIN.: Brand: ENDO CARRY-ON PROCEDURE KIT

## (undated) DEVICE — Device: Brand: ENDO SMARTCAP

## (undated) DEVICE — BRUSH ENDO CLN L90.5IN SHTH DIA1.7MM BRIST DIA5-7MM 2-6MM

## (undated) DEVICE — SINGLE PORT MANIFOLD: Brand: NEPTUNE 2

## (undated) DEVICE — CONMED SCOPE SAVER BITE BLOCK, 20X27 MM: Brand: SCOPE SAVER

## (undated) DEVICE — FORCEPS BX L240CM JAW DIA2.8MM L CAP W/ NDL MIC MESH TOOTH